# Patient Record
Sex: FEMALE | Race: WHITE | NOT HISPANIC OR LATINO | Employment: FULL TIME | URBAN - METROPOLITAN AREA
[De-identification: names, ages, dates, MRNs, and addresses within clinical notes are randomized per-mention and may not be internally consistent; named-entity substitution may affect disease eponyms.]

---

## 2022-11-09 ENCOUNTER — DOCUMENTATION ONLY (OUTPATIENT)
Dept: CARDIOLOGY | Facility: HOSPITAL | Age: 42
End: 2022-11-09
Payer: COMMERCIAL

## 2022-11-09 ENCOUNTER — CLINICAL SUPPORT (OUTPATIENT)
Dept: CARDIOLOGY | Facility: HOSPITAL | Age: 42
End: 2022-11-09
Attending: INTERNAL MEDICINE
Payer: COMMERCIAL

## 2022-11-09 ENCOUNTER — OFFICE VISIT (OUTPATIENT)
Dept: CARDIOLOGY | Facility: CLINIC | Age: 42
End: 2022-11-09
Payer: COMMERCIAL

## 2022-11-09 VITALS
BODY MASS INDEX: 36.38 KG/M2 | HEART RATE: 59 BPM | SYSTOLIC BLOOD PRESSURE: 110 MMHG | WEIGHT: 240.06 LBS | HEIGHT: 68 IN | DIASTOLIC BLOOD PRESSURE: 82 MMHG

## 2022-11-09 DIAGNOSIS — Z95.818 STATUS POST PLACEMENT OF IMPLANTABLE LOOP RECORDER: ICD-10-CM

## 2022-11-09 DIAGNOSIS — Z98.890 S/P ABLATION OF ATRIAL FLUTTER: ICD-10-CM

## 2022-11-09 DIAGNOSIS — Z86.79 S/P ABLATION OF ATRIAL FLUTTER: ICD-10-CM

## 2022-11-09 DIAGNOSIS — R94.39 EQUIVOCAL STRESS TEST: ICD-10-CM

## 2022-11-09 DIAGNOSIS — Z98.890 H/O CARDIAC RADIOFREQUENCY ABLATION: ICD-10-CM

## 2022-11-09 DIAGNOSIS — Z95.818 STATUS POST PLACEMENT OF IMPLANTABLE LOOP RECORDER: Primary | ICD-10-CM

## 2022-11-09 DIAGNOSIS — R94.39 ABNORMAL STRESS TEST: Primary | ICD-10-CM

## 2022-11-09 PROCEDURE — 99205 PR OFFICE/OUTPT VISIT, NEW, LEVL V, 60-74 MIN: ICD-10-PCS | Mod: S$GLB,,, | Performed by: INTERNAL MEDICINE

## 2022-11-09 PROCEDURE — 3079F PR MOST RECENT DIASTOLIC BLOOD PRESSURE 80-89 MM HG: ICD-10-PCS | Mod: CPTII,S$GLB,, | Performed by: INTERNAL MEDICINE

## 2022-11-09 PROCEDURE — 3008F BODY MASS INDEX DOCD: CPT | Mod: CPTII,S$GLB,, | Performed by: INTERNAL MEDICINE

## 2022-11-09 PROCEDURE — 93010 EKG 12-LEAD: ICD-10-PCS | Mod: S$GLB,,, | Performed by: INTERNAL MEDICINE

## 2022-11-09 PROCEDURE — 99999 PR PBB SHADOW E&M-NEW PATIENT-LVL III: ICD-10-PCS | Mod: PBBFAC,,, | Performed by: INTERNAL MEDICINE

## 2022-11-09 PROCEDURE — 99999 PR PBB SHADOW E&M-NEW PATIENT-LVL III: CPT | Mod: PBBFAC,,, | Performed by: INTERNAL MEDICINE

## 2022-11-09 PROCEDURE — 1159F PR MEDICATION LIST DOCUMENTED IN MEDICAL RECORD: ICD-10-PCS | Mod: CPTII,S$GLB,, | Performed by: INTERNAL MEDICINE

## 2022-11-09 PROCEDURE — 1159F MED LIST DOCD IN RCRD: CPT | Mod: CPTII,S$GLB,, | Performed by: INTERNAL MEDICINE

## 2022-11-09 PROCEDURE — 3008F PR BODY MASS INDEX (BMI) DOCUMENTED: ICD-10-PCS | Mod: CPTII,S$GLB,, | Performed by: INTERNAL MEDICINE

## 2022-11-09 PROCEDURE — 3074F SYST BP LT 130 MM HG: CPT | Mod: CPTII,S$GLB,, | Performed by: INTERNAL MEDICINE

## 2022-11-09 PROCEDURE — 93010 ELECTROCARDIOGRAM REPORT: CPT | Mod: S$GLB,,, | Performed by: INTERNAL MEDICINE

## 2022-11-09 PROCEDURE — 3074F PR MOST RECENT SYSTOLIC BLOOD PRESSURE < 130 MM HG: ICD-10-PCS | Mod: CPTII,S$GLB,, | Performed by: INTERNAL MEDICINE

## 2022-11-09 PROCEDURE — 99205 OFFICE O/P NEW HI 60 MIN: CPT | Mod: S$GLB,,, | Performed by: INTERNAL MEDICINE

## 2022-11-09 PROCEDURE — 1160F PR REVIEW ALL MEDS BY PRESCRIBER/CLIN PHARMACIST DOCUMENTED: ICD-10-PCS | Mod: CPTII,S$GLB,, | Performed by: INTERNAL MEDICINE

## 2022-11-09 PROCEDURE — 93005 ELECTROCARDIOGRAM TRACING: CPT | Mod: PO

## 2022-11-09 PROCEDURE — 1160F RVW MEDS BY RX/DR IN RCRD: CPT | Mod: CPTII,S$GLB,, | Performed by: INTERNAL MEDICINE

## 2022-11-09 PROCEDURE — 3079F DIAST BP 80-89 MM HG: CPT | Mod: CPTII,S$GLB,, | Performed by: INTERNAL MEDICINE

## 2022-11-09 RX ORDER — ASPIRIN 81 MG/1
81 TABLET ORAL DAILY
COMMUNITY

## 2022-11-09 RX ORDER — METOPROLOL TARTRATE 25 MG/1
25 TABLET, FILM COATED ORAL 2 TIMES DAILY
COMMUNITY
End: 2023-02-22 | Stop reason: SDUPTHER

## 2022-11-09 NOTE — PROGRESS NOTES
Subjective:    Patient ID:  Caroline Hartman is a 42 y.o. female who presents for evaluation of Abnormal Stress Test (New patient)      Problem List Items Addressed This Visit          Cardiac/Vascular    H/O cardiac radiofrequency ablation    Overview     SVT ablation around 2014 with Dr. Bliss         S/P ablation of atrial flutter    Overview     Dr. Bliss Around 2013          Other Visit Diagnoses       Abnormal stress test    -  Primary    Equivocal stress test                HPI    Here to establish care    The patient states that she has been seeing an outside Cardiologist for about 10 years. Wanting care in the [facility] system.    Still with intermittent chest pains, come son with stress  Had LHC in Feb 2021 without evidence of obstructive CAD  Symptoms persisting    No shortness of breath  Has loop recorder in place  Gets on treadmill and sometimes has exertional pain    Had echo in June 2022 with preserved EF and mid MR  Considering she was still having intermittent chest pain, had nuclear stress test with large anterior fixed defect, but preserved EF. Unclear cause per past Cards MD  Got diagnosed with atrial flutter in the past s/p ablations in the past, was on propafenone and taken off    Personal history of heart attack or stroke - None that she is aware of  Family history of heart disease - Dad with MI x 3, youngest was late 50s, has aFib, CHF    History reviewed. No pertinent past medical history.    History reviewed. No pertinent surgical history.    History reviewed. No pertinent family history.    Social History     Socioeconomic History    Marital status:        Review of patient's allergies indicates:   Allergen Reactions    Sulfa (sulfonamide antibiotics) Hives and Anxiety       Review of Systems   Constitutional: Negative for decreased appetite, fever and malaise/fatigue.   Eyes:  Negative for blurred vision.   Cardiovascular:  Negative for chest pain, dyspnea on exertion, irregular  "heartbeat and leg swelling.   Respiratory:  Negative for cough, hemoptysis, shortness of breath and wheezing.    Endocrine: Negative for cold intolerance and heat intolerance.   Hematologic/Lymphatic: Negative for bleeding problem.   Musculoskeletal:  Negative for muscle weakness and myalgias.   Gastrointestinal:  Negative for abdominal pain, constipation and diarrhea.   Genitourinary:  Negative for bladder incontinence.   Neurological:  Negative for dizziness and weakness.   Psychiatric/Behavioral:  Negative for depression.       Objective:     Vitals:    11/09/22 0904   BP: 110/82   BP Location: Right arm   Patient Position: Sitting   BP Method: Large (Automatic)   Pulse: (!) 59   Weight: 108.9 kg (240 lb 1.3 oz)   Height: 5' 8" (1.727 m)        Physical Exam  Vitals and nursing note reviewed.   Constitutional:       General: She is not in acute distress.     Appearance: She is well-developed.   HENT:      Head: Normocephalic and atraumatic.   Neck:      Vascular: No JVD.   Cardiovascular:      Rate and Rhythm: Normal rate and regular rhythm.      Heart sounds: Normal heart sounds. No murmur heard.    No friction rub. No gallop.   Pulmonary:      Effort: Pulmonary effort is normal. No respiratory distress.      Breath sounds: Normal breath sounds. No wheezing or rales.   Abdominal:      General: Bowel sounds are normal.      Palpations: Abdomen is soft.      Tenderness: There is no abdominal tenderness. There is no guarding or rebound.   Musculoskeletal:         General: No tenderness.      Cervical back: Neck supple.   Skin:     General: Skin is warm and dry.   Neurological:      Mental Status: She is alert and oriented to person, place, and time.   Psychiatric:         Behavior: Behavior normal.           Current Outpatient Medications   Medication Instructions    aspirin (ECOTRIN) 81 mg, Oral, Daily    metoprolol tartrate (LOPRESSOR) 25 mg, Oral, 2 times daily       Lipid Panel:   No results found for: CHOL, " HDL, LDLCALC, TRIG, CHOLHDL      The ASCVD Risk score (Maria Elena DK, et al., 2019) failed to calculate for the following reasons:    Cannot find a previous HDL lab    Cannot find a previous total cholesterol lab    The smoking status is invalid    Unable to determine if patient is Non-     All pertinent labs, imaging, and EKGs reviewed.  Patient's most recent EKG tracing was personally interpreted by this provider.    Most Recent Echocardiogram Results  No results found for this or any previous visit.        Most Recent EKG  No results found for this or any previous visit.    No valid procedures specified.   No results found for this or any previous visit.    No valid procedures specified.    Assessment:       1. Abnormal stress test    2. S/P ablation of atrial flutter    3. H/O cardiac radiofrequency ablation    4. Equivocal stress test         Plan:     Symptoms of atypical chest pain in the setting of equivocal nuclear stress test  BP/Pulse OK today  Most recent outside hospital echocardiogram reviewed personally     Cardiac PET stress at main [place] considering equivocal outside nuclear stress test   Lipid panel was done at outside hospital  Enroll in device clinic to follow loop recorder     Continue other cardiac medications  Mediterranean Diet/Cardiovascular Exercise Program    Patient queried and all questions were answered.    Greater than 60 minutes of total time was spent for this patient on the date of the encounter including chart review, outside records review, interview, and medical decision making.    F/u in 6 months to reassess      Signed:    Carlos Goode MD  11/9/2022 7:34 AM

## 2022-12-11 ENCOUNTER — CLINICAL SUPPORT (OUTPATIENT)
Dept: CARDIOLOGY | Facility: HOSPITAL | Age: 42
End: 2022-12-11
Payer: COMMERCIAL

## 2022-12-11 DIAGNOSIS — Z95.818 PRESENCE OF OTHER CARDIAC IMPLANTS AND GRAFTS: ICD-10-CM

## 2022-12-11 PROCEDURE — 93298 CARDIAC DEVICE CHECK - REMOTE: ICD-10-PCS | Mod: ,,, | Performed by: INTERNAL MEDICINE

## 2022-12-11 PROCEDURE — 93298 REM INTERROG DEV EVAL SCRMS: CPT | Mod: ,,, | Performed by: INTERNAL MEDICINE

## 2022-12-11 PROCEDURE — G2066 INTER DEVC REMOTE 30D: HCPCS | Mod: PO | Performed by: INTERNAL MEDICINE

## 2022-12-15 ENCOUNTER — TELEPHONE (OUTPATIENT)
Dept: CARDIOLOGY | Facility: HOSPITAL | Age: 42
End: 2022-12-15
Payer: COMMERCIAL

## 2023-01-10 ENCOUNTER — CLINICAL SUPPORT (OUTPATIENT)
Dept: CARDIOLOGY | Facility: HOSPITAL | Age: 43
End: 2023-01-10
Payer: COMMERCIAL

## 2023-01-10 DIAGNOSIS — Z95.818 PRESENCE OF OTHER CARDIAC IMPLANTS AND GRAFTS: ICD-10-CM

## 2023-01-10 PROCEDURE — G2066 INTER DEVC REMOTE 30D: HCPCS | Mod: PO | Performed by: INTERNAL MEDICINE

## 2023-02-08 ENCOUNTER — HOSPITAL ENCOUNTER (OUTPATIENT)
Dept: CARDIOLOGY | Facility: HOSPITAL | Age: 43
Discharge: HOME | End: 2023-02-08
Attending: INTERNAL MEDICINE
Payer: COMMERCIAL

## 2023-02-08 VITALS
WEIGHT: 240 LBS | HEART RATE: 82 BPM | BODY MASS INDEX: 36.37 KG/M2 | RESPIRATION RATE: 16 BRPM | SYSTOLIC BLOOD PRESSURE: 112 MMHG | HEIGHT: 68 IN | DIASTOLIC BLOOD PRESSURE: 52 MMHG

## 2023-02-08 DIAGNOSIS — R94.39 EQUIVOCAL STRESS TEST: ICD-10-CM

## 2023-02-08 LAB
CFR FLOW - ANTERIOR: 2.09
CFR FLOW - INFERIOR: 2.04
CFR FLOW - LATERAL: 2.15
CFR FLOW - MAX: 2.73
CFR FLOW - MIN: 1.59
CFR FLOW - SEPTAL: 2.27
CFR FLOW - WHOLE HEART: 2.14
CFR FLOW- DEFECT 1: 2.04
CV STRESS BASE HR: 65 BPM
DIASTOLIC BLOOD PRESSURE: 66 MMHG
EJECTION FRACTION- HIGH: 59 %
END DIASTOLIC INDEX-HIGH: 155 ML/M2
END DIASTOLIC INDEX-LOW: 91 ML/M2
END SYSTOLIC INDEX-HIGH: 78 ML/M2
END SYSTOLIC INDEX-LOW: 40 ML/M2
NUC REST DIASTOLIC VOLUME INDEX: 95
NUC REST EJECTION FRACTION: 67
NUC REST SYSTOLIC VOLUME INDEX: 32
NUC STRESS DIASTOLIC VOLUME INDEX: 101
NUC STRESS EJECTION FRACTION: 75 %
NUC STRESS SYSTOLIC VOLUME INDEX: 25
OHS CV CPX 1 MINUTE RECOVERY HEART RATE: 106 BPM
OHS CV CPX 85 PERCENT MAX PREDICTED HEART RATE MALE: 144
OHS CV CPX MAX PREDICTED HEART RATE: 170
OHS CV CPX PATIENT IS FEMALE: 1
OHS CV CPX PATIENT IS MALE: 0
OHS CV CPX PEAK DIASTOLIC BLOOD PRESSURE: 45 MMHG
OHS CV CPX PEAK HEAR RATE: 75 BPM
OHS CV CPX PEAK RATE PRESSURE PRODUCT: 8400
OHS CV CPX PEAK SYSTOLIC BLOOD PRESSURE: 112 MMHG
OHS CV CPX PERCENT MAX PREDICTED HEART RATE ACHIEVED: 44
OHS CV CPX RATE PRESSURE PRODUCT PRESENTING: 7735
PERFUSION DEFECT 1 SIZE IN %: 25 %
PERFUSION DEFECT 2 SIZE IN %: 3 %
REST FLOW - ANTERIOR: 0.52 CC/MIN/G
REST FLOW - INFERIOR: 0.55 CC/MIN/G
REST FLOW - LATERAL: 0.68 CC/MIN/G
REST FLOW - MAX: 1.03 CC/MIN/G
REST FLOW - MIN: 0.28 CC/MIN/G
REST FLOW - SEPTAL: 0.47 CC/MIN/G
REST FLOW - WHOLE HEART: 0.56 CC/MIN/G
REST FLOW- DEFECT 1: 0.38 CC/MIN/G
RETIRED EF AND QEF - SEE NOTES: 47 %
STRESS FLOW - ANTERIOR: 1.08 CC/MIN/G
STRESS FLOW - INFERIOR: 1.13 CC/MIN/G
STRESS FLOW - LATERAL: 1.44 CC/MIN/G
STRESS FLOW - MAX: 1.98 CC/MIN/G
STRESS FLOW - MIN: 0.57 CC/MIN/G
STRESS FLOW - SEPTAL: 1.07 CC/MIN/G
STRESS FLOW - WHOLE HEART: 1.18 CC/MIN/G
STRESS FLOW- DEFECT 1: 0.78 CC/MIN/G
SYSTOLIC BLOOD PRESSURE: 119 MMHG

## 2023-02-08 PROCEDURE — 78434 AQMBF PET REST & RX STRESS: CPT

## 2023-02-08 PROCEDURE — 78431 CARDIAC PET SCAN STRESS (CUPID ONLY): ICD-10-PCS | Mod: 26,,, | Performed by: INTERNAL MEDICINE

## 2023-02-08 PROCEDURE — 93018 CARDIAC PET SCAN STRESS (CUPID ONLY): ICD-10-PCS | Mod: ,,, | Performed by: INTERNAL MEDICINE

## 2023-02-08 PROCEDURE — 78434 AQMBF PET REST & RX STRESS: CPT | Mod: 26,,, | Performed by: INTERNAL MEDICINE

## 2023-02-08 PROCEDURE — 93016 CV STRESS TEST SUPVJ ONLY: CPT | Mod: ,,, | Performed by: INTERNAL MEDICINE

## 2023-02-08 PROCEDURE — 78431 MYOCRD IMG PET RST&STRS CT: CPT

## 2023-02-08 PROCEDURE — 78434 CARDIAC PET SCAN STRESS (CUPID ONLY): ICD-10-PCS | Mod: 26,,, | Performed by: INTERNAL MEDICINE

## 2023-02-08 PROCEDURE — 93016 CARDIAC PET SCAN STRESS (CUPID ONLY): ICD-10-PCS | Mod: ,,, | Performed by: INTERNAL MEDICINE

## 2023-02-08 PROCEDURE — 78431 MYOCRD IMG PET RST&STRS CT: CPT | Mod: 26,,, | Performed by: INTERNAL MEDICINE

## 2023-02-08 PROCEDURE — 63600175 PHARM REV CODE 636 W HCPCS: Performed by: INTERNAL MEDICINE

## 2023-02-08 PROCEDURE — 93018 CV STRESS TEST I&R ONLY: CPT | Mod: ,,, | Performed by: INTERNAL MEDICINE

## 2023-02-08 RX ORDER — REGADENOSON 0.08 MG/ML
0.4 INJECTION, SOLUTION INTRAVENOUS ONCE
Status: COMPLETED | OUTPATIENT
Start: 2023-02-08 | End: 2023-02-08

## 2023-02-08 RX ORDER — CAFFEINE CITRATE 20 MG/ML
60 SOLUTION INTRAVENOUS ONCE
Status: COMPLETED | OUTPATIENT
Start: 2023-02-08 | End: 2023-02-08

## 2023-02-08 RX ADMIN — CAFFEINE CITRATE 60 MG: 20 INJECTION INTRAVENOUS at 08:02

## 2023-02-08 RX ADMIN — REGADENOSON 0.4 MG: 0.08 INJECTION, SOLUTION INTRAVENOUS at 08:02

## 2023-02-09 ENCOUNTER — CLINICAL SUPPORT (OUTPATIENT)
Dept: CARDIOLOGY | Facility: HOSPITAL | Age: 43
End: 2023-02-09
Payer: COMMERCIAL

## 2023-02-09 DIAGNOSIS — Z95.818 PRESENCE OF OTHER CARDIAC IMPLANTS AND GRAFTS: ICD-10-CM

## 2023-02-09 PROCEDURE — 93298 REM INTERROG DEV EVAL SCRMS: CPT | Mod: ,,, | Performed by: INTERNAL MEDICINE

## 2023-02-09 PROCEDURE — G2066 INTER DEVC REMOTE 30D: HCPCS | Mod: PO | Performed by: INTERNAL MEDICINE

## 2023-02-09 PROCEDURE — 93298 CARDIAC DEVICE CHECK - REMOTE: ICD-10-PCS | Mod: ,,, | Performed by: INTERNAL MEDICINE

## 2023-02-22 DIAGNOSIS — Z98.890 H/O CARDIAC RADIOFREQUENCY ABLATION: ICD-10-CM

## 2023-02-22 DIAGNOSIS — Z98.890 S/P ABLATION OF ATRIAL FLUTTER: ICD-10-CM

## 2023-02-22 DIAGNOSIS — Z86.79 S/P ABLATION OF ATRIAL FLUTTER: ICD-10-CM

## 2023-02-22 DIAGNOSIS — Z00.00 ANNUAL PHYSICAL EXAM: Primary | ICD-10-CM

## 2023-02-22 RX ORDER — METOPROLOL TARTRATE 25 MG/1
25 TABLET, FILM COATED ORAL 2 TIMES DAILY
Qty: 180 TABLET | Refills: 3 | Status: SHIPPED | OUTPATIENT
Start: 2023-02-22 | End: 2023-03-29

## 2023-02-22 NOTE — TELEPHONE ENCOUNTER
----- Message from Xi Ahmadi sent at 2/22/2023  1:39 PM CST -----  Contact: patient  Type:  RX Refill Request    Who Called:  patient  Refill or New Rx:  refill  RX Name and Strength:  metoprolol tartrate (LOPRESSOR) 25 MG tablet  How is the patient currently taking it? (ex. 1XDay):  as directed  Is this a 30 day or 90 day RX:  90  Preferred Pharmacy with phone number:    [id number] [place] [address] & [address] [address]  Phone: [phone] Fax: [phone]  Local or Mail Order:  local  Ordering Provider:  mando Joe Call Back Number:  [phone]  Additional Information:

## 2023-03-11 ENCOUNTER — CLINICAL SUPPORT (OUTPATIENT)
Dept: CARDIOLOGY | Facility: HOSPITAL | Age: 43
End: 2023-03-11
Payer: COMMERCIAL

## 2023-03-11 DIAGNOSIS — Z95.818 PRESENCE OF OTHER CARDIAC IMPLANTS AND GRAFTS: ICD-10-CM

## 2023-03-11 PROCEDURE — G2066 INTER DEVC REMOTE 30D: HCPCS | Mod: PO | Performed by: INTERNAL MEDICINE

## 2023-03-28 NOTE — PROGRESS NOTES
"Subjective:    Patient ID:  Caroline Hartman is a 42 y.o. female who presents for follow-up of abdnormal stress test      Problem List Items Addressed This Visit          Cardiac/Vascular    H/O cardiac radiofrequency ablation    Overview     SVT ablation around 2014 with Dr. Bliss         S/P ablation of atrial flutter    Overview     Dr. Bliss Around 2013          Other Visit Diagnoses       Abnormal stress test    -  Primary            HPI    Patient was last seen on 11/09/2022 at which time she presented to [facility] care.  Had abnormal nuclear stress test that showed large scar.  Cardiac PET stress was ordered for evaluation.  Results as below.  Presents today to discuss PET stress results.    On assessment today, the patient states that she feels OK. Still with some intermittent fleeting pains.     Was on treadmill a few months ago and had chest pain with exertion.    Staying as active as she can       Objective:     Vitals:    03/29/23 0927   BP: 114/78   BP Location: Left arm   Patient Position: Sitting   BP Method: Large (Automatic)   Pulse: (!) 58   Weight: 111.6 kg (246 lb 0.5 oz)   Height: 5' 8" (1.727 m)        Physical Exam  Vitals and nursing note reviewed.   Constitutional:       General: She is not in acute distress.     Appearance: She is well-developed.   HENT:      Head: Normocephalic and atraumatic.   Neck:      Vascular: No JVD.   Cardiovascular:      Rate and Rhythm: Normal rate and regular rhythm.      Heart sounds: Normal heart sounds. No murmur heard.    No friction rub. No gallop.   Pulmonary:      Effort: Pulmonary effort is normal. No respiratory distress.      Breath sounds: Normal breath sounds. No wheezing or rales.   Abdominal:      General: Bowel sounds are normal.      Palpations: Abdomen is soft.      Tenderness: There is no abdominal tenderness. There is no guarding or rebound.   Musculoskeletal:         General: No tenderness.      Cervical back: Neck supple.   Skin:     " General: Skin is warm and dry.   Neurological:      Mental Status: She is alert and oriented to person, place, and time.   Psychiatric:         Behavior: Behavior normal.           Current Outpatient Medications   Medication Instructions    aspirin (ECOTRIN) 81 mg, Oral, Daily    metoprolol tartrate (LOPRESSOR) 25 mg, Oral, 2 times daily    naproxen (NAPROSYN) 500 mg, Oral, Every 12 hours PRN    ondansetron (ZOFRAN-ODT) 4 mg, Oral, Every 6 hours PRN    pantoprazole (PROTONIX) 20 mg, Oral, Daily    propafenone (RHTHYMOL) 150 mg, Oral, Every 8 hours       Lipid Panel:   No results found for: CHOL, HDL, LDLCALC, TRIG, CHOLHDL    The ASCVD Risk score (Maria Elena DK, et al., 2019) failed to calculate for the following reasons:    Cannot find a previous HDL lab    Cannot find a previous total cholesterol lab    The smoking status is invalid    All pertinent labs, imaging, and EKGs reviewed.  Patient's most recent EKG tracing was personally interpreted by this provider.    Most Recent EKG Results  Results for orders placed or performed in visit on 11/09/22   IN OFFICE EKG 12-LEAD (to [place])    Collection Time: 11/09/22  8:59 AM    Narrative    Test Reason : z00.00    Vent. Rate : 070 BPM     Atrial Rate : 070 BPM     P-R Int : 168 ms          QRS Dur : 086 ms      QT Int : 398 ms       P-R-T Axes : 026 052 020 degrees     QTc Int : 429 ms    Normal sinus rhythm  Normal ECG  No previous ECGs available  Confirmed by JEREMY DOSS MD ([id number]) on 11/9/2022 6:56:00 PM    Referred By: [email or web address]   SELF           Confirmed By:JEREMY DOSS MD       Most Recent Echocardiogram Results  No results found for this or any previous visit.      Most Recent Nuclear Stress Test Results  No results found for this or any previous visit.      Most Recent Cardiac PET Stress Test Results  Results for orders placed during the hospital encounter of 02/08/23    Cardiac PET Scan Stress    Interpretation Summary    There are two significant perfusion  abnormalities.    Perfusion Abnormality #1 - First, there is a large sized, mild to moderate intensity mid to apical anterior, anteroseptal, inferoapical and lateral apical resting perfusion abnormality involving 25% of LV myocardium in the typical distribution of the distal LAD territory. After pharmacologic stress, this defect improves, indicative of non-transmural scar.    Within the perfusion abnormality, absolute myocardial perfusion (cc/min/gm) averaged 0.38 cc/min/g at rest, 0.78 cc/min/g at stress and CFR was 2.04 , which equates to mildly reduced coronary flow capacity within the LAD territory.    Perfusion Abnormality #2 - Second, there is a very small (<5%) sized, mild intensity basal to mid anterior resting perfusion abnormality involving 3% of LV myocardium in the typical distribution of the ramus territory. After pharmacologic stress, this defect improves, indicative of non-transmural scar.    There are no other significant perfusion abnormalities.    The whole heart absolute myocardial perfusion values averaged 0.56 cc/min/g at rest, which is reduced; 1.18 cc/min/g at stress, which is mildly reduced; and CFR is 2.14 , which is mildly reduced.    CT attenuation images demonstrate no coronary calcifications and no aortic calcifications.    The gated perfusion images showed an ejection fraction of 67% at rest and 75% during stress. A normal ejection fraction is greater than 47%.    The wall motion is normal at rest and during stress.    The LV cavity size is normal at rest and stress.    The ECG portion of the study is negative for ischemia.    During stress, rare PVCs are noted.    The patient reported chest pain during the stress test.    There are no prior studies for comparison.      Most Recent Cardiovascular Angiogram results  No results found for this or any previous visit.      Other Most Recent Cardiology Results  Results for orders placed in visit on 03/11/23    Cardiac device check -  Remote    Narrative  Battery and Leads (BL)  Normal battery parameters    Presenting Rhythm (OK)  Normal Sinus Rhythm    Arrhythmic events (AE)  No new arrhythmic events in monitoring period    Transmission Information (TI)  Implant indication: Atrial Fibrillation Management  Device Summary Report    Follow Up (FU)  Continue remote monitoring with monthly reporting    Additional Comments  ILR Report  Monitoring period: 1/20/23-2/19/23    Battery Status: OK        Assessment:       1. Abnormal stress test    2. H/O cardiac radiofrequency ablation    3. S/P ablation of atrial flutter         Plan:     Symptoms of intermittent pains persist  BP/Pulse OK today  Most recent echocardiogram reviewed personally   Most recent cardiac PET stress reviewed personally   Had LH in 2021 without obstructive CAD, had a bad reaction to it, thought she had a stroke, and hesitant to do another one   Need to consider cardiac syndrome X as well    Will monitor at this time and hold off on angiogram unless symptoms worsen  Start amlodipine 2.5mg PO Daily - Educated on risks/benefits of medication  Continue aspirin 81 mg PO Daily  Reduce metoprolol tartrate to 12.5 mg PO BID  Continue propafenone 150 mg q.8 hours   Lipid panel  ED precautions given    Continue other cardiac medications  Mediterranean Diet/Cardiovascular Exercise Program    Patient queried and all questions were answered.    F/u in 3-4 months to reassess symptoms      Signed:    Carlos Goode MD  3/29/2023 5:09 PM

## 2023-03-29 ENCOUNTER — OFFICE VISIT (OUTPATIENT)
Dept: CARDIOLOGY | Facility: CLINIC | Age: 43
End: 2023-03-29
Payer: COMMERCIAL

## 2023-03-29 VITALS
HEIGHT: 68 IN | BODY MASS INDEX: 37.29 KG/M2 | WEIGHT: 246.06 LBS | HEART RATE: 58 BPM | DIASTOLIC BLOOD PRESSURE: 78 MMHG | SYSTOLIC BLOOD PRESSURE: 114 MMHG

## 2023-03-29 DIAGNOSIS — Z98.890 H/O CARDIAC RADIOFREQUENCY ABLATION: ICD-10-CM

## 2023-03-29 DIAGNOSIS — R94.39 ABNORMAL STRESS TEST: Primary | ICD-10-CM

## 2023-03-29 DIAGNOSIS — Z00.00 ANNUAL PHYSICAL EXAM: ICD-10-CM

## 2023-03-29 DIAGNOSIS — Z86.79 S/P ABLATION OF ATRIAL FLUTTER: ICD-10-CM

## 2023-03-29 DIAGNOSIS — Z98.890 S/P ABLATION OF ATRIAL FLUTTER: ICD-10-CM

## 2023-03-29 DIAGNOSIS — Z13.220 SCREENING CHOLESTEROL LEVEL: ICD-10-CM

## 2023-03-29 PROCEDURE — 93010 ELECTROCARDIOGRAM REPORT: CPT | Mod: S$GLB,,, | Performed by: INTERNAL MEDICINE

## 2023-03-29 PROCEDURE — 99999 PR PBB SHADOW E&M-EST. PATIENT-LVL III: ICD-10-PCS | Mod: PBBFAC,,, | Performed by: INTERNAL MEDICINE

## 2023-03-29 PROCEDURE — 93010 EKG 12-LEAD: ICD-10-PCS | Mod: S$GLB,,, | Performed by: INTERNAL MEDICINE

## 2023-03-29 PROCEDURE — 1159F PR MEDICATION LIST DOCUMENTED IN MEDICAL RECORD: ICD-10-PCS | Mod: CPTII,S$GLB,, | Performed by: INTERNAL MEDICINE

## 2023-03-29 PROCEDURE — 1160F PR REVIEW ALL MEDS BY PRESCRIBER/CLIN PHARMACIST DOCUMENTED: ICD-10-PCS | Mod: CPTII,S$GLB,, | Performed by: INTERNAL MEDICINE

## 2023-03-29 PROCEDURE — 99214 PR OFFICE/OUTPT VISIT, EST, LEVL IV, 30-39 MIN: ICD-10-PCS | Mod: 25,S$GLB,, | Performed by: INTERNAL MEDICINE

## 2023-03-29 PROCEDURE — 3008F PR BODY MASS INDEX (BMI) DOCUMENTED: ICD-10-PCS | Mod: CPTII,S$GLB,, | Performed by: INTERNAL MEDICINE

## 2023-03-29 PROCEDURE — 3078F PR MOST RECENT DIASTOLIC BLOOD PRESSURE < 80 MM HG: ICD-10-PCS | Mod: CPTII,S$GLB,, | Performed by: INTERNAL MEDICINE

## 2023-03-29 PROCEDURE — 3008F BODY MASS INDEX DOCD: CPT | Mod: CPTII,S$GLB,, | Performed by: INTERNAL MEDICINE

## 2023-03-29 PROCEDURE — 3074F PR MOST RECENT SYSTOLIC BLOOD PRESSURE < 130 MM HG: ICD-10-PCS | Mod: CPTII,S$GLB,, | Performed by: INTERNAL MEDICINE

## 2023-03-29 PROCEDURE — 3074F SYST BP LT 130 MM HG: CPT | Mod: CPTII,S$GLB,, | Performed by: INTERNAL MEDICINE

## 2023-03-29 PROCEDURE — 1160F RVW MEDS BY RX/DR IN RCRD: CPT | Mod: CPTII,S$GLB,, | Performed by: INTERNAL MEDICINE

## 2023-03-29 PROCEDURE — 3078F DIAST BP <80 MM HG: CPT | Mod: CPTII,S$GLB,, | Performed by: INTERNAL MEDICINE

## 2023-03-29 PROCEDURE — 99999 PR PBB SHADOW E&M-EST. PATIENT-LVL III: CPT | Mod: PBBFAC,,, | Performed by: INTERNAL MEDICINE

## 2023-03-29 PROCEDURE — 1159F MED LIST DOCD IN RCRD: CPT | Mod: CPTII,S$GLB,, | Performed by: INTERNAL MEDICINE

## 2023-03-29 PROCEDURE — 93005 ELECTROCARDIOGRAM TRACING: CPT | Mod: PO

## 2023-03-29 PROCEDURE — 99214 OFFICE O/P EST MOD 30 MIN: CPT | Mod: 25,S$GLB,, | Performed by: INTERNAL MEDICINE

## 2023-03-29 RX ORDER — METOPROLOL TARTRATE 25 MG/1
12.5 TABLET, FILM COATED ORAL 2 TIMES DAILY
Qty: 90 TABLET | Refills: 3 | Status: SHIPPED | OUTPATIENT
Start: 2023-03-29 | End: 2023-04-28

## 2023-03-29 RX ORDER — AMLODIPINE BESYLATE 2.5 MG/1
2.5 TABLET ORAL DAILY
Qty: 90 TABLET | Refills: 3 | Status: SHIPPED | OUTPATIENT
Start: 2023-03-29 | End: 2023-04-04 | Stop reason: ALTCHOICE

## 2023-03-30 ENCOUNTER — PATIENT MESSAGE (OUTPATIENT)
Dept: CARDIOLOGY | Facility: CLINIC | Age: 43
End: 2023-03-30
Payer: COMMERCIAL

## 2023-04-03 ENCOUNTER — PATIENT MESSAGE (OUTPATIENT)
Dept: CARDIOLOGY | Facility: CLINIC | Age: 43
End: 2023-04-03
Payer: COMMERCIAL

## 2023-04-04 RX ORDER — NIFEDIPINE 30 MG/1
30 TABLET, FILM COATED, EXTENDED RELEASE ORAL DAILY
Qty: 90 TABLET | Refills: 3 | Status: SHIPPED | OUTPATIENT
Start: 2023-04-04 | End: 2023-04-28

## 2023-04-05 ENCOUNTER — PATIENT MESSAGE (OUTPATIENT)
Dept: CARDIOLOGY | Facility: CLINIC | Age: 43
End: 2023-04-05
Payer: COMMERCIAL

## 2023-04-06 ENCOUNTER — PATIENT MESSAGE (OUTPATIENT)
Dept: CARDIOLOGY | Facility: CLINIC | Age: 43
End: 2023-04-06
Payer: COMMERCIAL

## 2023-04-10 ENCOUNTER — CLINICAL SUPPORT (OUTPATIENT)
Dept: CARDIOLOGY | Facility: HOSPITAL | Age: 43
End: 2023-04-10
Payer: COMMERCIAL

## 2023-04-10 DIAGNOSIS — Z95.818 PRESENCE OF OTHER CARDIAC IMPLANTS AND GRAFTS: ICD-10-CM

## 2023-04-10 PROCEDURE — G2066 INTER DEVC REMOTE 30D: HCPCS | Mod: PO | Performed by: INTERNAL MEDICINE

## 2023-04-10 PROCEDURE — 93298 CARDIAC DEVICE CHECK - REMOTE: ICD-10-PCS | Mod: ,,, | Performed by: INTERNAL MEDICINE

## 2023-04-10 PROCEDURE — 93298 REM INTERROG DEV EVAL SCRMS: CPT | Mod: ,,, | Performed by: INTERNAL MEDICINE

## 2023-04-27 ENCOUNTER — PATIENT MESSAGE (OUTPATIENT)
Dept: CARDIOLOGY | Facility: CLINIC | Age: 43
End: 2023-04-27
Payer: COMMERCIAL

## 2023-04-27 NOTE — PROGRESS NOTES
Subjective:    Patient ID:  Caroline Hartman is a 42 y.o. female who presents for follow-up of No chief complaint on file.      TELEMEDICINE VISIT      Problem List Items Addressed This Visit          Cardiac/Vascular    H/O cardiac radiofrequency ablation - Primary    Overview     SVT ablation around 2014 with Dr. Bliss           S/P ablation of atrial flutter    Overview     Dr. Bliss Around 2013              HPI    Patient was last seen on 03/29/2023 at which time she was dealing with some intermittent fleeting pain.  Amlodipine 2.5 mg PO Daily was started and metoprolol was reduced.  Patient did poorly on amlodipine, she was switched nifedipine and continued to do poorly.  Presents today for further discussion.    Patient presents for telemedicine visit.    On assessment today, the patient states that she is feeling OK.    Had a lightheadedness episode yesterday. Close to passing out. Had some palpitations. [place] and was back in the office doing . Has been a very long time since she has had an episode like that.    No chest pains.     Current metoprolol dose - 25mg PO BID  Doing OK off of propafenone, generally         Objective:   There were no vitals filed for this visit.    BP Readings from Last 5 Encounters:   03/29/23 114/78   02/08/23 (!) 112/52   11/09/22 110/82   12/24/17 (!) 163/91        Physical Exam  Constitutional:       General: She is not in acute distress.     Appearance: She is well-developed. She is not diaphoretic.   HENT:      Head: Normocephalic and atraumatic.   Eyes:      General: No scleral icterus.     Conjunctiva/sclera: Conjunctivae normal.   Pulmonary:      Effort: No respiratory distress.      Breath sounds: No stridor.   Musculoskeletal:         General: No signs of injury.      Cervical back: Normal range of motion.   Skin:     Coloration: Skin is not jaundiced.   Neurological:      Mental Status: She is alert. Mental status is at  baseline.   Psychiatric:         Mood and Affect: Mood normal.         Behavior: Behavior normal.           Current Outpatient Medications   Medication Instructions    aspirin (ECOTRIN) 81 mg, Oral, Daily    metoprolol tartrate (LOPRESSOR) 12.5 mg, Oral, 2 times daily    naproxen (NAPROSYN) 500 mg, Oral, Every 12 hours PRN    NIFEdipine (ADALAT CC) 30 mg, Oral, Daily    ondansetron (ZOFRAN-ODT) 4 mg, Oral, Every 6 hours PRN    pantoprazole (PROTONIX) 20 mg, Oral, Daily       Lipid Panel:   No results found for: CHOL, HDL, LDLCALC, TRIG, CHOLHDL    The ASCVD Risk score ([place] DK, et al., 2019) failed to calculate for the following reasons:    Cannot find a previous HDL lab    Cannot find a previous total cholesterol lab    The smoking status is invalid    All pertinent labs, imaging, and EKGs reviewed.  Patient's most recent EKG tracing was personally interpreted by this provider.    Most Recent EKG Results  Results for orders placed or performed in visit on 03/29/23   IN OFFICE EKG 12-LEAD (to [facility])    Collection Time: 03/29/23  9:33 AM    Narrative    Test Reason : Z98.890,Z98.890,Z86.79,R94.39,    Vent. Rate : 067 BPM     Atrial Rate : 067 BPM     P-R Int : 170 ms          QRS Dur : 078 ms      QT Int : 404 ms       P-R-T Axes : 050 083 047 degrees     QTc Int : 426 ms    Normal sinus rhythm  Normal ECG  When compared with ECG of 08-FEB-2023 08:32,  Previous ECG has undetermined rhythm, needs review  Confirmed by Russel SHANKAR, Carlos CUEVAS ([id number]) on 3/29/2023 12:17:34 PM    Referred By:             Confirmed By:Carlos Goode MD       Most Recent Echocardiogram Results  No results found for this or any previous visit.      Most Recent Nuclear Stress Test Results  No results found for this or any previous visit.      Most Recent Cardiac PET Stress Test Results  Results for orders placed during the hospital encounter of 02/08/23    Cardiac PET Scan Stress    Interpretation Summary    There are two significant  perfusion abnormalities.    Perfusion Abnormality #1 - First, there is a large sized, mild to moderate intensity mid to apical anterior, anteroseptal, inferoapical and lateral apical resting perfusion abnormality involving 25% of LV myocardium in the typical distribution of the distal LAD territory. After pharmacologic stress, this defect improves, indicative of non-transmural scar.    Within the perfusion abnormality, absolute myocardial perfusion (cc/min/gm) averaged 0.38 cc/min/g at rest, 0.78 cc/min/g at stress and CFR was 2.04 , which equates to mildly reduced coronary flow capacity within the LAD territory.    Perfusion Abnormality #2 - Second, there is a very small (<5%) sized, mild intensity basal to mid anterior resting perfusion abnormality involving 3% of LV myocardium in the typical distribution of the ramus territory. After pharmacologic stress, this defect improves, indicative of non-transmural scar.    There are no other significant perfusion abnormalities.    The whole heart absolute myocardial perfusion values averaged 0.56 cc/min/g at rest, which is reduced; 1.18 cc/min/g at stress, which is mildly reduced; and CFR is 2.14 , which is mildly reduced.    CT attenuation images demonstrate no coronary calcifications and no aortic calcifications.    The gated perfusion images showed an ejection fraction of 67% at rest and 75% during stress. A normal ejection fraction is greater than 47%.    The wall motion is normal at rest and during stress.    The LV cavity size is normal at rest and stress.    The ECG portion of the study is negative for ischemia.    During stress, rare PVCs are noted.    The patient reported chest pain during the stress test.    There are no prior studies for comparison.      Most Recent Cardiovascular Angiogram results  No results found for this or any previous visit.      Other Most Recent Cardiology Results  Results for orders placed in visit on 05/10/23    Cardiac device check -  Remote    Narrative  Battery and Leads (BL)  Normal battery parameters    Presenting Rhythm (FL)  Sinus Bradycardia    Arrhythmic events (AE)  False event(s) recorded due to PAC(s)/SVT/PVC(s)/VT    Transmission Information (TI)  Device Summary Report  Implant indication: Atrial Fibrillation Management    Follow Up (FU)  Continue remote monitoring with monthly reporting    Additional Comments  ILR Report.  Monitoring period:  03/21/23-04/20/23    Battery Status: OK      Assessment:       1. H/O cardiac radiofrequency ablation    2. S/P ablation of atrial flutter         Plan:     Symptoms now more concerning for recurrent arrhythmia, loop recorder evaluation from yesterday's event pending  BP/Pulse unable to be assessed on telemedicine visit  Most recent echocardiogram reviewed personally    Most recent cardiac PET stress reviewed personally   Had LHC in 2021 without obstructive CAD, had a bad reaction to it, thought she had a stroke, and hesitant to do another one   Need to consider cardiac syndrome X as well    Can consider Coronary CTA in the future if further ischemic eval is needed   Can consider Imdur 30 mg PO Daily in the future if chest pain recurs   Continue aspirin 81 mg PO Daily  Continue metoprolol tartrate 12.5 mg PO BID  If symptoms recur or ILR shows arrhythmia with yesterday episode, will need to restart the propafenone   May consider EP study if symptoms worsen   Stay well hydrated    Continue other cardiac medications  Mediterranean Diet/Cardiovascular Exercise Program    Patient queried and all questions were answered.    F/u in 3 months to reassess, virtual OK    The patient location is: Home   The chief complaint leading to consultation is:  Please see problem list above  Visit type: Virtual visit with synchronous audio and video  Total time spent with patient: 15 minutes   Each patient to whom he or she provides medical services by telemedicine is:  (1) informed of the relationship between the  physician and patient and the respective role of any other health care provider with respect to management of the patient; and (2) notified that he or she may decline to receive medical services by telemedicine and may withdraw from such care at any time.    Notes: See above       Signed:    Carlos oGode MD  4/27/2023 7:29 AM

## 2023-04-28 ENCOUNTER — TELEPHONE (OUTPATIENT)
Dept: CARDIOLOGY | Facility: HOSPITAL | Age: 43
End: 2023-04-28
Payer: COMMERCIAL

## 2023-04-28 ENCOUNTER — PATIENT MESSAGE (OUTPATIENT)
Dept: CARDIOLOGY | Facility: HOSPITAL | Age: 43
End: 2023-04-28
Payer: COMMERCIAL

## 2023-04-28 ENCOUNTER — OFFICE VISIT (OUTPATIENT)
Dept: CARDIOLOGY | Facility: CLINIC | Age: 43
End: 2023-04-28
Payer: COMMERCIAL

## 2023-04-28 DIAGNOSIS — Z98.890 S/P ABLATION OF ATRIAL FLUTTER: ICD-10-CM

## 2023-04-28 DIAGNOSIS — Z00.00 ANNUAL PHYSICAL EXAM: ICD-10-CM

## 2023-04-28 DIAGNOSIS — Z98.890 H/O CARDIAC RADIOFREQUENCY ABLATION: Primary | ICD-10-CM

## 2023-04-28 DIAGNOSIS — Z86.79 S/P ABLATION OF ATRIAL FLUTTER: ICD-10-CM

## 2023-04-28 PROCEDURE — 1159F MED LIST DOCD IN RCRD: CPT | Mod: CPTII,95,, | Performed by: INTERNAL MEDICINE

## 2023-04-28 PROCEDURE — 99214 OFFICE O/P EST MOD 30 MIN: CPT | Mod: 95,,, | Performed by: INTERNAL MEDICINE

## 2023-04-28 PROCEDURE — 99214 PR OFFICE/OUTPT VISIT, EST, LEVL IV, 30-39 MIN: ICD-10-PCS | Mod: 95,,, | Performed by: INTERNAL MEDICINE

## 2023-04-28 PROCEDURE — 1159F PR MEDICATION LIST DOCUMENTED IN MEDICAL RECORD: ICD-10-PCS | Mod: CPTII,95,, | Performed by: INTERNAL MEDICINE

## 2023-04-28 PROCEDURE — 1160F RVW MEDS BY RX/DR IN RCRD: CPT | Mod: CPTII,95,, | Performed by: INTERNAL MEDICINE

## 2023-04-28 PROCEDURE — 1160F PR REVIEW ALL MEDS BY PRESCRIBER/CLIN PHARMACIST DOCUMENTED: ICD-10-PCS | Mod: CPTII,95,, | Performed by: INTERNAL MEDICINE

## 2023-04-28 RX ORDER — METOPROLOL TARTRATE 25 MG/1
25 TABLET, FILM COATED ORAL 2 TIMES DAILY
Qty: 180 TABLET | Refills: 3
Start: 2023-04-28 | End: 2024-03-20

## 2023-05-01 ENCOUNTER — TELEPHONE (OUTPATIENT)
Dept: CARDIOLOGY | Facility: CLINIC | Age: 43
End: 2023-05-01
Payer: COMMERCIAL

## 2023-05-01 NOTE — TELEPHONE ENCOUNTER
Reviewed symptom event from 4/27/2023      EGM c/w SR w/ PVC

## 2023-05-01 NOTE — TELEPHONE ENCOUNTER
----- Message from Carlos Goode MD sent at 4/28/2023  2:37 PM CDT -----  OK, thanks  ----- Message -----  From: Jeanne Escalona  Sent: 4/28/2023   1:02 PM CDT  To: Carlos Goode MD    Her home monitor hasn't transmitted since 4/24. Ines called her to send a transmission, but the monitor was unplugged. She plugged it in to charge for an hour and tried to send, but didn't so Ines called her but she left home. She is going to let us know when she gets back home to troubleshoot. I'll send to you when we get it   Jeanne  ----- Message -----  From: Carlos Goode MD  Sent: 4/28/2023  10:03 AM CDT  To: Jeanne Escalona, Catina Galvan RN    She had an episode yesterday and pushed her symptom button, can we pull the strips please    -Carlos

## 2023-05-01 NOTE — TELEPHONE ENCOUNTER
Spoke to pt and advised per Dr Goode (Please let the patient know that the symptom she told me about correlated to regular rhythm with an isolated PVC, so no major significant arrhythmia which is good news)

## 2023-05-10 ENCOUNTER — CLINICAL SUPPORT (OUTPATIENT)
Dept: CARDIOLOGY | Facility: HOSPITAL | Age: 43
End: 2023-05-10
Payer: COMMERCIAL

## 2023-05-10 DIAGNOSIS — Z95.818 PRESENCE OF OTHER CARDIAC IMPLANTS AND GRAFTS: ICD-10-CM

## 2023-05-10 PROCEDURE — G2066 INTER DEVC REMOTE 30D: HCPCS | Mod: PO | Performed by: INTERNAL MEDICINE

## 2023-06-09 ENCOUNTER — CLINICAL SUPPORT (OUTPATIENT)
Dept: CARDIOLOGY | Facility: HOSPITAL | Age: 43
End: 2023-06-09
Payer: COMMERCIAL

## 2023-06-09 DIAGNOSIS — Z95.818 PRESENCE OF OTHER CARDIAC IMPLANTS AND GRAFTS: ICD-10-CM

## 2023-06-09 PROCEDURE — 93298 CARDIAC DEVICE CHECK - REMOTE: ICD-10-PCS | Mod: ,,, | Performed by: INTERNAL MEDICINE

## 2023-06-09 PROCEDURE — 93298 REM INTERROG DEV EVAL SCRMS: CPT | Mod: ,,, | Performed by: INTERNAL MEDICINE

## 2023-06-09 PROCEDURE — G2066 INTER DEVC REMOTE 30D: HCPCS | Mod: PO | Performed by: INTERNAL MEDICINE

## 2023-07-09 ENCOUNTER — CLINICAL SUPPORT (OUTPATIENT)
Dept: CARDIOLOGY | Facility: HOSPITAL | Age: 43
End: 2023-07-09
Payer: COMMERCIAL

## 2023-07-09 DIAGNOSIS — Z95.818 PRESENCE OF OTHER CARDIAC IMPLANTS AND GRAFTS: ICD-10-CM

## 2023-07-09 PROCEDURE — G2066 INTER DEVC REMOTE 30D: HCPCS | Mod: PO | Performed by: INTERNAL MEDICINE

## 2023-08-03 NOTE — PROGRESS NOTES
Subjective:    Patient ID:  Caroline Hartman is a 43 y.o. female who presents for follow-up of No chief complaint on file.      TELEMEDICINE VISIT      Problem List Items Addressed This Visit          Cardiac/Vascular    H/O cardiac radiofrequency ablation - Primary    Overview     SVT ablation around 2014 with Dr. Bliss         S/P ablation of atrial flutter    Overview     Dr. Bliss Around 2013            HPI    Patient was last seen on 04/28/2023 at which time plan was to investigate loop recorder for any evidence of arrhythmia.  Loop recorder showed sinus rhythm/sinus bradycardia with episode.    Patient presents for telemedicine visit.    On assessment today, the patient states that she feels well.    No significant recent issues.    No chest pain.  Rare palpitations.  How active are you - Trying to take it easy    Home BP - Good per her report       Objective:   There were no vitals filed for this visit.    BP Readings from Last 5 Encounters:   03/29/23 114/78   02/08/23 (!) 112/52   11/09/22 110/82   12/24/17 (!) 163/91        Physical Exam  Constitutional:       General: She is not in acute distress.     Appearance: She is well-developed. She is not diaphoretic.   HENT:      Head: Normocephalic and atraumatic.   Eyes:      General: No scleral icterus.     Conjunctiva/sclera: Conjunctivae normal.   Pulmonary:      Effort: No respiratory distress.      Breath sounds: No stridor.   Musculoskeletal:         General: No signs of injury.      Cervical back: Normal range of motion.   Skin:     Coloration: Skin is not jaundiced.   Neurological:      Mental Status: She is alert. Mental status is at baseline.   Psychiatric:         Mood and Affect: Mood normal.         Behavior: Behavior normal.             Current Outpatient Medications   Medication Instructions    aspirin (ECOTRIN) 81 mg, Oral, Daily    metoprolol tartrate (LOPRESSOR) 25 mg, Oral, 2 times daily    naproxen (NAPROSYN) 500 mg, Oral, Every 12 hours  "PRN    ondansetron (ZOFRAN-ODT) 4 mg, Oral, Every 6 hours PRN    pantoprazole (PROTONIX) 20 mg, Oral, Daily       Lipid Panel:   No results found for: "CHOL", "HDL", "LDLCALC", "TRIG", "CHOLHDL"    The ASCVD Risk score (Maria Elena LARA, et al., 2019) failed to calculate for the following reasons:    Cannot find a previous HDL lab    Cannot find a previous total cholesterol lab    The smoking status is invalid    All pertinent labs, imaging, and EKGs reviewed.  Patient's most recent EKG tracing was personally interpreted by this provider.    Most Recent EKG Results  Results for orders placed or performed in visit on 03/29/23   IN OFFICE EKG 12-LEAD (to [place])    Collection Time: 03/29/23  9:33 AM    Narrative    Test Reason : Z98.890,Z98.890,Z86.79,R94.39,    Vent. Rate : 067 BPM     Atrial Rate : 067 BPM     P-R Int : 170 ms          QRS Dur : 078 ms      QT Int : 404 ms       P-R-T Axes : 050 083 047 degrees     QTc Int : 426 ms    Normal sinus rhythm  Normal ECG  When compared with ECG of 08-FEB-2023 08:32,  Previous ECG has undetermined rhythm, needs review  Confirmed by Russel SHANKAR, Carlos CUEVAS ([id number]) on 3/29/2023 12:17:34 PM    Referred By:             Confirmed By:Carlos Goode MD       Most Recent Echocardiogram Results  No results found for this or any previous visit.      Most Recent Nuclear Stress Test Results  No results found for this or any previous visit.      Most Recent Cardiac PET Stress Test Results  Results for orders placed during the hospital encounter of 02/08/23    Cardiac PET Scan Stress    Interpretation Summary    There are two significant perfusion abnormalities.    Perfusion Abnormality #1 - First, there is a large sized, mild to moderate intensity mid to apical anterior, anteroseptal, inferoapical and lateral apical resting perfusion abnormality involving 25% of LV myocardium in the typical distribution of the distal LAD territory. After pharmacologic stress, this defect improves, indicative " of non-transmural scar.    Within the perfusion abnormality, absolute myocardial perfusion (cc/min/gm) averaged 0.38 cc/min/g at rest, 0.78 cc/min/g at stress and CFR was 2.04 , which equates to mildly reduced coronary flow capacity within the LAD territory.    Perfusion Abnormality #2 - Second, there is a very small (<5%) sized, mild intensity basal to mid anterior resting perfusion abnormality involving 3% of LV myocardium in the typical distribution of the ramus territory. After pharmacologic stress, this defect improves, indicative of non-transmural scar.    There are no other significant perfusion abnormalities.    The whole heart absolute myocardial perfusion values averaged 0.56 cc/min/g at rest, which is reduced; 1.18 cc/min/g at stress, which is mildly reduced; and CFR is 2.14 , which is mildly reduced.    CT attenuation images demonstrate no coronary calcifications and no aortic calcifications.    The gated perfusion images showed an ejection fraction of 67% at rest and 75% during stress. A normal ejection fraction is greater than 47%.    The wall motion is normal at rest and during stress.    The LV cavity size is normal at rest and stress.    The ECG portion of the study is negative for ischemia.    During stress, rare PVCs are noted.    The patient reported chest pain during the stress test.    There are no prior studies for comparison.      Most Recent Cardiovascular Angiogram results  No results found for this or any previous visit.      Other Most Recent Cardiology Results  Results for orders placed in visit on 08/08/23    Cardiac device check - Remote    Narrative  Battery and Leads (BL)  Normal battery parameters    Presenting Rhythm (MS)  Sinus Bradycardia    Arrhythmic events (AE)  False event(s) recorded due to PAC(s)/SVT/PVC(s)/VT    Transmission Information (TI)  Device Summary Report  Implant indication: Atrial Fibrillation Management    Follow Up (FU)  Continue remote monitoring with monthly  reporting    Additional Comments  ILR Report.  Monitoring period:  06/19/23-07/20/23    Battery Status: OK      Assessment:       1. H/O cardiac radiofrequency ablation    2. S/P ablation of atrial flutter         Plan:     Symptoms OK today  BP/Pulse unable to be assessed on telemedicine visit  Most recent echocardiogram reviewed personally    Most recent cardiac PET stress reviewed personally   Had LHC in 2021 without obstructive CAD, had a bad reaction to it, thought she had a stroke, and hesitant to do another one   Need to consider cardiac syndrome X as well     Can consider Coronary CTA in the future if further ischemic eval is needed   Can consider Imdur 30 mg PO Daily in the future if chest pain recurs   Continue aspirin 81 mg PO Daily  Continue metoprolol tartrate 12.5 mg PO BID  If arrhythmia recurs, will need to restart propafenone   May consider EP study if symptoms worsen   Stay well hydrated    Continue other cardiac medications  Mediterranean Diet/Cardiovascular Exercise Program    Patient queried and all questions were answered.    F/u in 6-9 months to reassess, virtual OK    The patient location is: Home   The chief complaint leading to consultation is:  Please see problem list above  Visit type: Virtual visit with synchronous audio and video  Total time spent with patient: 15 minutes   Each patient to whom he or she provides medical services by telemedicine is:  (1) informed of the relationship between the physician and patient and the respective role of any other health care provider with respect to management of the patient; and (2) notified that he or she may decline to receive medical services by telemedicine and may withdraw from such care at any time.    Notes: See above       Signed:    Carlos Goode MD  8/3/2023 11:40 AM

## 2023-08-04 ENCOUNTER — OFFICE VISIT (OUTPATIENT)
Dept: CARDIOLOGY | Facility: CLINIC | Age: 43
End: 2023-08-04
Payer: COMMERCIAL

## 2023-08-04 DIAGNOSIS — Z98.890 S/P ABLATION OF ATRIAL FLUTTER: ICD-10-CM

## 2023-08-04 DIAGNOSIS — Z98.890 H/O CARDIAC RADIOFREQUENCY ABLATION: Primary | ICD-10-CM

## 2023-08-04 DIAGNOSIS — Z86.79 S/P ABLATION OF ATRIAL FLUTTER: ICD-10-CM

## 2023-08-04 PROCEDURE — 1160F RVW MEDS BY RX/DR IN RCRD: CPT | Mod: CPTII,95,, | Performed by: INTERNAL MEDICINE

## 2023-08-04 PROCEDURE — 99214 OFFICE O/P EST MOD 30 MIN: CPT | Mod: 95,,, | Performed by: INTERNAL MEDICINE

## 2023-08-04 PROCEDURE — 1159F PR MEDICATION LIST DOCUMENTED IN MEDICAL RECORD: ICD-10-PCS | Mod: CPTII,95,, | Performed by: INTERNAL MEDICINE

## 2023-08-04 PROCEDURE — 99214 PR OFFICE/OUTPT VISIT, EST, LEVL IV, 30-39 MIN: ICD-10-PCS | Mod: 95,,, | Performed by: INTERNAL MEDICINE

## 2023-08-04 PROCEDURE — 1159F MED LIST DOCD IN RCRD: CPT | Mod: CPTII,95,, | Performed by: INTERNAL MEDICINE

## 2023-08-04 PROCEDURE — 1160F PR REVIEW ALL MEDS BY PRESCRIBER/CLIN PHARMACIST DOCUMENTED: ICD-10-PCS | Mod: CPTII,95,, | Performed by: INTERNAL MEDICINE

## 2023-08-08 ENCOUNTER — CLINICAL SUPPORT (OUTPATIENT)
Dept: CARDIOLOGY | Facility: HOSPITAL | Age: 43
End: 2023-08-08
Payer: COMMERCIAL

## 2023-08-08 DIAGNOSIS — Z95.818 PRESENCE OF OTHER CARDIAC IMPLANTS AND GRAFTS: ICD-10-CM

## 2023-08-08 PROCEDURE — 93298 CARDIAC DEVICE CHECK - REMOTE: ICD-10-PCS | Mod: ,,, | Performed by: INTERNAL MEDICINE

## 2023-08-08 PROCEDURE — 93298 REM INTERROG DEV EVAL SCRMS: CPT | Mod: ,,, | Performed by: INTERNAL MEDICINE

## 2023-08-08 PROCEDURE — G2066 INTER DEVC REMOTE 30D: HCPCS | Mod: PO | Performed by: INTERNAL MEDICINE

## 2023-09-07 ENCOUNTER — CLINICAL SUPPORT (OUTPATIENT)
Dept: CARDIOLOGY | Facility: HOSPITAL | Age: 43
End: 2023-09-07
Payer: COMMERCIAL

## 2023-09-07 DIAGNOSIS — Z95.818 PRESENCE OF OTHER CARDIAC IMPLANTS AND GRAFTS: ICD-10-CM

## 2023-09-07 PROCEDURE — G2066 INTER DEVC REMOTE 30D: HCPCS | Mod: PO | Performed by: INTERNAL MEDICINE

## 2023-10-07 ENCOUNTER — CLINICAL SUPPORT (OUTPATIENT)
Dept: CARDIOLOGY | Facility: HOSPITAL | Age: 43
End: 2023-10-07
Payer: COMMERCIAL

## 2023-10-07 DIAGNOSIS — Z95.818 PRESENCE OF OTHER CARDIAC IMPLANTS AND GRAFTS: ICD-10-CM

## 2023-10-07 PROCEDURE — 93298 CARDIAC DEVICE CHECK - REMOTE: ICD-10-PCS | Mod: ,,, | Performed by: INTERNAL MEDICINE

## 2023-10-07 PROCEDURE — 93298 REM INTERROG DEV EVAL SCRMS: CPT | Mod: ,,, | Performed by: INTERNAL MEDICINE

## 2023-10-07 PROCEDURE — G2066 INTER DEVC REMOTE 30D: HCPCS | Mod: PO | Performed by: INTERNAL MEDICINE

## 2023-10-10 ENCOUNTER — TELEPHONE (OUTPATIENT)
Dept: SURGERY | Facility: CLINIC | Age: 43
End: 2023-10-10
Payer: COMMERCIAL

## 2023-10-11 ENCOUNTER — OFFICE VISIT (OUTPATIENT)
Dept: SURGERY | Facility: CLINIC | Age: 43
End: 2023-10-11
Payer: COMMERCIAL

## 2023-10-11 VITALS
OXYGEN SATURATION: 98 % | BODY MASS INDEX: 38.26 KG/M2 | WEIGHT: 252.44 LBS | HEART RATE: 60 BPM | SYSTOLIC BLOOD PRESSURE: 115 MMHG | DIASTOLIC BLOOD PRESSURE: 79 MMHG | HEIGHT: 68 IN | RESPIRATION RATE: 18 BRPM

## 2023-10-11 DIAGNOSIS — K92.1 BLOOD IN STOOL: Primary | ICD-10-CM

## 2023-10-11 PROCEDURE — 1160F PR REVIEW ALL MEDS BY PRESCRIBER/CLIN PHARMACIST DOCUMENTED: ICD-10-PCS | Mod: CPTII,S$GLB,, | Performed by: COLON & RECTAL SURGERY

## 2023-10-11 PROCEDURE — 3074F SYST BP LT 130 MM HG: CPT | Mod: CPTII,S$GLB,, | Performed by: COLON & RECTAL SURGERY

## 2023-10-11 PROCEDURE — 3008F PR BODY MASS INDEX (BMI) DOCUMENTED: ICD-10-PCS | Mod: CPTII,S$GLB,, | Performed by: COLON & RECTAL SURGERY

## 2023-10-11 PROCEDURE — 3008F BODY MASS INDEX DOCD: CPT | Mod: CPTII,S$GLB,, | Performed by: COLON & RECTAL SURGERY

## 2023-10-11 PROCEDURE — 3078F PR MOST RECENT DIASTOLIC BLOOD PRESSURE < 80 MM HG: ICD-10-PCS | Mod: CPTII,S$GLB,, | Performed by: COLON & RECTAL SURGERY

## 2023-10-11 PROCEDURE — 1159F MED LIST DOCD IN RCRD: CPT | Mod: CPTII,S$GLB,, | Performed by: COLON & RECTAL SURGERY

## 2023-10-11 PROCEDURE — 99999 PR PBB SHADOW E&M-EST. PATIENT-LVL III: ICD-10-PCS | Mod: PBBFAC,,, | Performed by: COLON & RECTAL SURGERY

## 2023-10-11 PROCEDURE — 99203 PR OFFICE/OUTPT VISIT, NEW, LEVL III, 30-44 MIN: ICD-10-PCS | Mod: S$GLB,,, | Performed by: COLON & RECTAL SURGERY

## 2023-10-11 PROCEDURE — 99203 OFFICE O/P NEW LOW 30 MIN: CPT | Mod: S$GLB,,, | Performed by: COLON & RECTAL SURGERY

## 2023-10-11 PROCEDURE — 1160F RVW MEDS BY RX/DR IN RCRD: CPT | Mod: CPTII,S$GLB,, | Performed by: COLON & RECTAL SURGERY

## 2023-10-11 PROCEDURE — 3078F DIAST BP <80 MM HG: CPT | Mod: CPTII,S$GLB,, | Performed by: COLON & RECTAL SURGERY

## 2023-10-11 PROCEDURE — 3074F PR MOST RECENT SYSTOLIC BLOOD PRESSURE < 130 MM HG: ICD-10-PCS | Mod: CPTII,S$GLB,, | Performed by: COLON & RECTAL SURGERY

## 2023-10-11 PROCEDURE — 1159F PR MEDICATION LIST DOCUMENTED IN MEDICAL RECORD: ICD-10-PCS | Mod: CPTII,S$GLB,, | Performed by: COLON & RECTAL SURGERY

## 2023-10-11 PROCEDURE — 99999 PR PBB SHADOW E&M-EST. PATIENT-LVL III: CPT | Mod: PBBFAC,,, | Performed by: COLON & RECTAL SURGERY

## 2023-10-11 NOTE — PROGRESS NOTES
"CRS Office Visit History and Physical      SUBJECTIVE:     Chief Complaint: Anal pain and bleeding    History of Present Illness:  The patient is new patient to this practice.   Course is as follows:  Patient is a 43 y.o. female presents with anal pain and bleeding.  Usually happens with her cycle. Hemorrhoids do swell. + bleeding. Lasts for about a week. This recent episode lasted 1 month.  Symptoms have been present for several years.  Has tried OTC hemorrhoid cream. Helps with pain but not pressure.  Previous anorectal procedures: No  Had lots of swelling after her 2nd delivery. Had ?excision of thrombosed external.  denies straining/prolonged time on toilet with bowel movements.  is not currently taking fiber supplement of stool softener  Blood thinners: No (Aspirin)  Saw Stacie Rand in 2017 for pruritis    Last Colonoscopy:   Family history of colorectal cancer or IBD: Mother.    Review of patient's allergies indicates:   Allergen Reactions    Sulfa (sulfonamide antibiotics)     Sulfa (sulfonamide antibiotics) Hives and Anxiety       Past Medical History:   Diagnosis Date    Atrial flutter     PVC (premature ventricular contraction)     SVT (supraventricular tachycardia)      Past Surgical History:   Procedure Laterality Date     SECTION      loop recorder      SHOULDER SURGERY       No family history on file.  Social History     Tobacco Use    Smokeless tobacco: Never   Substance Use Topics    Alcohol use: No        Review of Systems:  ROS    OBJECTIVE:     Vital Signs (Most Recent)  /79   Pulse 60   Resp 18   Ht 5' 8" (1.727 m)   Wt 114.5 kg (252 lb 6.8 oz)   SpO2 98%   BMI 38.38 kg/m²     Physical Exam:  General: White female in no distress   Neuro: Alert and oriented x 4.  Moves all extremities.     HEENT: No icterus.  Trachea midline  Respiratory: Respirations are even and unlabored  Cardiac: Regular rate  Extremities: Warm dry and intact  Skin: No rashes    Anorectal:   External " exam: Perianal skin excoriation, circumferential soft skin tags, no clear fissure, no thrombosed hemorrhoids  Digital exam revealed normal tone. Some tenderness. No masses.    Anoscopy:  Deferred    ASSESSMENT/PLAN:     42yo F w/ hemorrhoid swelling and bleeding    The patient was instructed to:  Increase water intake to at least 8-10 glasses of water per day.  Take a daily fiber supplement (Konsyl, Benefiber, Metamucil) and increase dietary intake to 20-30 grams/day.  Avoid straining or spending >5min/event with bowel movements.  Use a bidet to clean as well as a topical cream such as Calmoseptine  Schedule colonoscopy (high-risk given mother's history), possible banding at that time. Message sent to schedulers.    Latonia Cruz MD  Staff Surgeon, Colon and Rectal Surgery  [facility]

## 2023-10-13 ENCOUNTER — PATIENT MESSAGE (OUTPATIENT)
Dept: SURGERY | Facility: CLINIC | Age: 43
End: 2023-10-13
Payer: COMMERCIAL

## 2023-10-13 ENCOUNTER — TELEPHONE (OUTPATIENT)
Dept: SURGERY | Facility: CLINIC | Age: 43
End: 2023-10-13
Payer: COMMERCIAL

## 2023-10-13 NOTE — TELEPHONE ENCOUNTER
----- Message from Amanda Atkinson sent at 10/13/2023 12:47 PM CDT -----  Regarding: pt adivce  Contact: [phone]  Pt calling in regards to scheduling colonoscopy, pt needing order to be scheduled. Pls call

## 2023-10-14 ENCOUNTER — TELEPHONE (OUTPATIENT)
Dept: ENDOSCOPY | Facility: HOSPITAL | Age: 43
End: 2023-10-14
Payer: COMMERCIAL

## 2023-10-14 VITALS — WEIGHT: 240 LBS | BODY MASS INDEX: 35.55 KG/M2 | HEIGHT: 69 IN

## 2023-10-14 DIAGNOSIS — Z12.11 SCREEN FOR COLON CANCER: Primary | ICD-10-CM

## 2023-10-14 NOTE — TELEPHONE ENCOUNTER
"----- Message from Michelle Alamo sent at 10/12/2023  8:56 AM CDT -----    ----- Message -----  From: Latonia Cruz MD  Sent: 10/11/2023   3:00 PM CDT  To: [facility]    Procedure: Colonoscopy (possible banding)    Diagnosis: Screening colonoscopy    Procedure Timin-12 weeks    #If within 4 weeks selected, please angie as high priority#    #If greater than 12 weeks, please select "5-12 weeks" and delay sending until 3 months prior to requested date#     Provider: Myself    Location: [address]    Additional Scheduling Information: No scheduling concerns    Prep Specifications:Standard prep    Is the patient taking a GLP-1 Agonist:no    Have you attached a patient to this message: yes       "

## 2023-10-14 NOTE — TELEPHONE ENCOUNTER
Spoke to Caroline to schedule procedure(s) Colonoscopy       Physician to perform procedure(s) Dr. CLAUDE Cruz  Date of Procedure (s) 12/18/23  Arrival Time 11:30 AM  Time of Procedure(s) 12:30 AM   Location of Procedure(s) [facility]  Type of Rx Prep sent to patient: PEG  Instructions provided to patient via [facility]    Patient was informed on the following information and verbalized understanding. Screening questionnaire reviewed with patient and complete. If procedure requires anesthesia, a responsible adult needs to be present to accompany the patient home, patient cannot drive after receiving anesthesia. Appointment details are tentative, especially check-in time. Patient will receive a prep-op call 4 days prior to confirm check-in time for procedure. If applicable the patient should contact their pharmacy to verify Rx for procedure prep is ready for pick-up. Patient was advised to call the scheduling department at [phone] if pharmacy states no Rx is available. Patient was advised to call the endoscopy scheduling department if any questions or concerns arise.      [facility]oscopy Scheduling Department

## 2023-10-27 ENCOUNTER — PATIENT MESSAGE (OUTPATIENT)
Dept: CARDIOLOGY | Facility: CLINIC | Age: 43
End: 2023-10-27
Payer: COMMERCIAL

## 2023-11-15 ENCOUNTER — CLINICAL SUPPORT (OUTPATIENT)
Dept: CARDIOLOGY | Facility: HOSPITAL | Age: 43
End: 2023-11-15
Attending: INTERNAL MEDICINE
Payer: COMMERCIAL

## 2023-11-15 ENCOUNTER — CLINICAL SUPPORT (OUTPATIENT)
Dept: CARDIOLOGY | Facility: HOSPITAL | Age: 43
End: 2023-11-15
Payer: COMMERCIAL

## 2023-11-15 DIAGNOSIS — I49.8 OTHER SPECIFIED CARDIAC ARRHYTHMIAS: ICD-10-CM

## 2023-11-15 PROCEDURE — 93298 REM INTERROG DEV EVAL SCRMS: CPT | Mod: ,,, | Performed by: INTERNAL MEDICINE

## 2023-11-15 PROCEDURE — 93298 CARDIAC DEVICE CHECK - REMOTE: ICD-10-PCS | Mod: ,,, | Performed by: INTERNAL MEDICINE

## 2023-11-15 PROCEDURE — G2066 INTER DEVC REMOTE 30D: HCPCS | Mod: PO | Performed by: INTERNAL MEDICINE

## 2023-11-17 LAB
OHS CV AF BURDEN PERCENT: < 1
OHS CV DC REMOTE DEVICE TYPE: NORMAL

## 2023-12-11 DIAGNOSIS — Z12.11 SPECIAL SCREENING FOR MALIGNANT NEOPLASMS, COLON: Primary | ICD-10-CM

## 2023-12-11 RX ORDER — POLYETHYLENE GLYCOL 3350, SODIUM SULFATE ANHYDROUS, SODIUM BICARBONATE, SODIUM CHLORIDE, POTASSIUM CHLORIDE 236; 22.74; 6.74; 5.86; 2.97 G/4L; G/4L; G/4L; G/4L; G/4L
4 POWDER, FOR SOLUTION ORAL ONCE
Qty: 4000 ML | Refills: 0 | Status: SHIPPED | OUTPATIENT
Start: 2023-12-11 | End: 2023-12-11

## 2023-12-15 ENCOUNTER — CLINICAL SUPPORT (OUTPATIENT)
Dept: CARDIOLOGY | Facility: HOSPITAL | Age: 43
End: 2023-12-15
Payer: COMMERCIAL

## 2023-12-15 ENCOUNTER — CLINICAL SUPPORT (OUTPATIENT)
Dept: CARDIOLOGY | Facility: HOSPITAL | Age: 43
End: 2023-12-15
Attending: INTERNAL MEDICINE
Payer: COMMERCIAL

## 2023-12-15 ENCOUNTER — TELEPHONE (OUTPATIENT)
Dept: CARDIOLOGY | Facility: HOSPITAL | Age: 43
End: 2023-12-15
Payer: COMMERCIAL

## 2023-12-15 DIAGNOSIS — I49.8 OTHER SPECIFIED CARDIAC ARRHYTHMIAS: ICD-10-CM

## 2023-12-15 PROCEDURE — G2066 INTER DEVC REMOTE 30D: HCPCS | Mod: PO | Performed by: INTERNAL MEDICINE

## 2023-12-16 ENCOUNTER — CLINICAL SUPPORT (OUTPATIENT)
Dept: CARDIOLOGY | Facility: HOSPITAL | Age: 43
End: 2023-12-16
Attending: INTERNAL MEDICINE
Payer: COMMERCIAL

## 2023-12-16 PROCEDURE — 93298 CARDIAC DEVICE CHECK - REMOTE: ICD-10-PCS | Mod: ,,, | Performed by: INTERNAL MEDICINE

## 2023-12-16 PROCEDURE — 93298 REM INTERROG DEV EVAL SCRMS: CPT | Mod: ,,, | Performed by: INTERNAL MEDICINE

## 2023-12-18 ENCOUNTER — HOSPITAL ENCOUNTER (OUTPATIENT)
Facility: HOSPITAL | Age: 43
Discharge: HOME | End: 2023-12-18
Attending: COLON & RECTAL SURGERY | Admitting: COLON & RECTAL SURGERY
Payer: COMMERCIAL

## 2023-12-18 ENCOUNTER — ANESTHESIA EVENT (OUTPATIENT)
Dept: ENDOSCOPY | Facility: HOSPITAL | Age: 43
End: 2023-12-18
Payer: COMMERCIAL

## 2023-12-18 ENCOUNTER — ANESTHESIA (OUTPATIENT)
Dept: ENDOSCOPY | Facility: HOSPITAL | Age: 43
End: 2023-12-18
Payer: COMMERCIAL

## 2023-12-18 VITALS
DIASTOLIC BLOOD PRESSURE: 60 MMHG | SYSTOLIC BLOOD PRESSURE: 111 MMHG | OXYGEN SATURATION: 99 % | HEART RATE: 56 BPM | TEMPERATURE: 98 F | RESPIRATION RATE: 18 BRPM

## 2023-12-18 DIAGNOSIS — Z12.11 SCREENING FOR MALIGNANT NEOPLASM OF COLON: Primary | ICD-10-CM

## 2023-12-18 LAB
B-HCG UR QL: NEGATIVE
CTP QC/QA: YES

## 2023-12-18 PROCEDURE — 46221 LIGATION OF HEMORRHOID(S): CPT | Mod: 51,,, | Performed by: COLON & RECTAL SURGERY

## 2023-12-18 PROCEDURE — E9220 PRA ENDO ANESTHESIA: ICD-10-PCS | Mod: 33,,, | Performed by: NURSE ANESTHETIST, CERTIFIED REGISTERED

## 2023-12-18 PROCEDURE — 37000009 HC ANESTHESIA EA ADD 15 MINS: Performed by: COLON & RECTAL SURGERY

## 2023-12-18 PROCEDURE — 45385 COLONOSCOPY W/LESION REMOVAL: CPT | Performed by: COLON & RECTAL SURGERY

## 2023-12-18 PROCEDURE — 88305 TISSUE EXAM BY PATHOLOGIST: CPT | Performed by: STUDENT IN AN ORGANIZED HEALTH CARE EDUCATION/TRAINING PROGRAM

## 2023-12-18 PROCEDURE — 37000008 HC ANESTHESIA 1ST 15 MINUTES: Performed by: COLON & RECTAL SURGERY

## 2023-12-18 PROCEDURE — 63600175 PHARM REV CODE 636 W HCPCS: Performed by: NURSE ANESTHETIST, CERTIFIED REGISTERED

## 2023-12-18 PROCEDURE — 81025 URINE PREGNANCY TEST: CPT | Performed by: COLON & RECTAL SURGERY

## 2023-12-18 PROCEDURE — 88305 TISSUE EXAM BY PATHOLOGIST: CPT | Mod: 26,,, | Performed by: STUDENT IN AN ORGANIZED HEALTH CARE EDUCATION/TRAINING PROGRAM

## 2023-12-18 PROCEDURE — 46221 PR HEMORRHOIDECTOMY INTERNAL RUBBER BAND LIGATIONS: ICD-10-PCS | Mod: 51,,, | Performed by: COLON & RECTAL SURGERY

## 2023-12-18 PROCEDURE — 88305 TISSUE EXAM BY PATHOLOGIST: ICD-10-PCS | Mod: 26,,, | Performed by: STUDENT IN AN ORGANIZED HEALTH CARE EDUCATION/TRAINING PROGRAM

## 2023-12-18 PROCEDURE — 45385 COLONOSCOPY W/LESION REMOVAL: CPT | Mod: ,,, | Performed by: COLON & RECTAL SURGERY

## 2023-12-18 PROCEDURE — E9220 PRA ENDO ANESTHESIA: HCPCS | Mod: 33,,, | Performed by: NURSE ANESTHETIST, CERTIFIED REGISTERED

## 2023-12-18 PROCEDURE — 45385 PR COLONOSCOPY,REMV LESN,SNARE: ICD-10-PCS | Mod: ,,, | Performed by: COLON & RECTAL SURGERY

## 2023-12-18 PROCEDURE — 46221 LIGATION OF HEMORRHOID(S): CPT | Performed by: COLON & RECTAL SURGERY

## 2023-12-18 PROCEDURE — 25000003 PHARM REV CODE 250: Performed by: NURSE ANESTHETIST, CERTIFIED REGISTERED

## 2023-12-18 RX ORDER — SODIUM CHLORIDE 9 MG/ML
INJECTION, SOLUTION INTRAVENOUS CONTINUOUS
Status: DISCONTINUED | OUTPATIENT
Start: 2023-12-18 | End: 2023-12-18 | Stop reason: HOSPADM

## 2023-12-18 RX ORDER — LIDOCAINE HYDROCHLORIDE 20 MG/ML
INJECTION, SOLUTION EPIDURAL; INFILTRATION; INTRACAUDAL; PERINEURAL
Status: DISCONTINUED | OUTPATIENT
Start: 2023-12-18 | End: 2023-12-18

## 2023-12-18 RX ORDER — PROPOFOL 10 MG/ML
VIAL (ML) INTRAVENOUS
Status: DISCONTINUED | OUTPATIENT
Start: 2023-12-18 | End: 2023-12-18

## 2023-12-18 RX ORDER — FENTANYL CITRATE 50 UG/ML
INJECTION, SOLUTION INTRAMUSCULAR; INTRAVENOUS
Status: DISCONTINUED | OUTPATIENT
Start: 2023-12-18 | End: 2023-12-18

## 2023-12-18 RX ORDER — PHENYLEPHRINE HYDROCHLORIDE 10 MG/ML
INJECTION INTRAVENOUS
Status: DISCONTINUED | OUTPATIENT
Start: 2023-12-18 | End: 2023-12-18

## 2023-12-18 RX ADMIN — PROPOFOL 100 MG: 10 INJECTION, EMULSION INTRAVENOUS at 12:12

## 2023-12-18 RX ADMIN — PHENYLEPHRINE HYDROCHLORIDE 100 MCG: 10 INJECTION INTRAVENOUS at 01:12

## 2023-12-18 RX ADMIN — FENTANYL CITRATE 25 MCG: 50 INJECTION, SOLUTION INTRAMUSCULAR; INTRAVENOUS at 01:12

## 2023-12-18 RX ADMIN — SODIUM CHLORIDE: 0.9 INJECTION, SOLUTION INTRAVENOUS at 12:12

## 2023-12-18 RX ADMIN — FENTANYL CITRATE 50 MCG: 50 INJECTION, SOLUTION INTRAMUSCULAR; INTRAVENOUS at 01:12

## 2023-12-18 RX ADMIN — PHENYLEPHRINE HYDROCHLORIDE 100 MCG: 10 INJECTION INTRAVENOUS at 12:12

## 2023-12-18 RX ADMIN — LIDOCAINE HYDROCHLORIDE 100 MG: 20 INJECTION, SOLUTION EPIDURAL; INFILTRATION; INTRACAUDAL; PERINEURAL at 12:12

## 2023-12-18 NOTE — TRANSFER OF CARE
Anesthesia Transfer of Care Note    Patient: Caroline Hartman    Procedure(s) Performed: Procedure(s) (LRB):  COLONOSCOPY (N/A)    Patient location: PACU    Anesthesia Type: general    Transport from OR: Transported from OR on room air with adequate spontaneous ventilation    Post pain: adequate analgesia    Post assessment: no apparent anesthetic complications and tolerated procedure well    Post vital signs: stable    Level of consciousness: awake, alert and oriented    Nausea/Vomiting: no nausea/vomiting    Complications: none    Transfer of care protocol was followed      Last vitals:

## 2023-12-18 NOTE — ANESTHESIA POSTPROCEDURE EVALUATION
Anesthesia Post Evaluation    Patient: Caroline Hartman    Procedure(s) Performed: Procedure(s) (LRB):  COLONOSCOPY (N/A)    Final Anesthesia Type: general      Patient location during evaluation: GI PACU  Patient participation: Yes- Able to Participate  Level of consciousness: awake and alert  Post-procedure vital signs: reviewed and stable  Pain management: adequate  Airway patency: patent    PONV status at discharge: No PONV  Anesthetic complications: no      Cardiovascular status: blood pressure returned to baseline  Respiratory status: unassisted, spontaneous ventilation and room air  Hydration status: euvolemic                Vitals Value Taken Time   /60 12/18/23 1400   Temp 36.7 °C (98 °F) 12/18/23 1330   Pulse 56 12/18/23 1400   Resp 18 12/18/23 1400   SpO2 99 % 12/18/23 1400         Event Time   Out of Recovery 14:07:14         Pain/Dante Score: Dante Score: 10 (12/18/2023  1:45 PM)

## 2023-12-18 NOTE — ANESTHESIA PREPROCEDURE EVALUATION
2023  Caroline Hartman is a 43 y.o., female.    [facility]  Anesthesia Pre-Operative Evaluation       Patient Name: Caroline Hartman  YOB: 1980  MRN: [id number]  [facility]: [id number]      Code Status: No Order   Date of Procedure: 2023  Anesthesia: Choice Procedure: Procedure(s) (LRB):  COLONOSCOPY (N/A)  Pre-Operative Diagnosis: Screen for colon cancer [Z12.11]  Proceduralist: Surgeon(s) and Role:     * Latonia Cruz MD - Primary Registered Nurse: Barbara Lopez RN      SUBJECTIVE:   Caroline Hartman is a 43 y.o. female who is here today for Procedure(s) (LRB):  COLONOSCOPY (N/A).   No notes on file    Anticoagulants   Medication Route Frequency     she has a current medication list which includes the following long-term medication(s): aspirin, metoprolol tartrate, and pantoprazole.   ALLERGIES:     Review of patient's allergies indicates:   Allergen Reactions    Sulfa (sulfonamide antibiotics)     Sulfa (sulfonamide antibiotics) Hives and Anxiety     LDA:          Lines/Drains/Airways       Peripheral Intravenous Line  Duration                  Peripheral IV - Single Lumen 17 1617 Left Antecubital 2184 days                  History:   There are no hospital problems to display for this patient.    Patient Active Problem List   Diagnosis    S/P ablation of atrial flutter    H/O cardiac radiofrequency ablation     Medical History   Past Medical History:   Diagnosis Date    Atrial flutter     PVC (premature ventricular contraction)     SVT (supraventricular tachycardia)      Surgical History:    has a past surgical history that includes loop recorder;  section; and Shoulder surgery.   Social History:    reports that she does not have a smoking history on file. She has never used smokeless tobacco. She reports that she does not drink alcohol.     OBJECTIVE:  "    Vital Signs (Most Recent):    Vital Signs Range (Last 24H):        Last 3 Vitals:       3/29/2023     9:27 AM 10/11/2023     2:29 PM 10/14/2023    11:00 AM   Vitals - 1 value per visit   SYSTOLIC 114 115    DIASTOLIC 78 79    Pulse 58 60    Resp  18    SPO2  98 %    Weight (lb) 246.03 252.43 240   Weight (kg) 111.6 114.5 108.863   Height 5' 8" (1.727 m) 5' 8" (1.727 m) 5' 9" (1.753 m)   BMI (Calculated) 37.4 38.4 35.4   Pain Score Zero Six      There is no height or weight on file to calculate BMI.   Wt Readings from Last 4 Encounters:   10/14/23 108.9 kg (240 lb)   10/11/23 114.5 kg (252 lb 6.8 oz)   03/29/23 111.6 kg (246 lb 0.5 oz)   02/08/23 108.9 kg (240 lb)       EKG:   Results for orders placed or performed in visit on 03/29/23   IN OFFICE EKG 12-LEAD (to [place])    Collection Time: 03/29/23  9:33 AM    Narrative    Test Reason : Z98.890,Z98.890,Z86.79,R94.39,    Vent. Rate : 067 BPM     Atrial Rate : 067 BPM     P-R Int : 170 ms          QRS Dur : 078 ms      QT Int : 404 ms       P-R-T Axes : 050 083 047 degrees     QTc Int : 426 ms    Normal sinus rhythm  Normal ECG  When compared with ECG of 08-FEB-2023 08:32,  Previous ECG has undetermined rhythm, needs review  Confirmed by Russel SHANKAR, Carlos CUEVAS ([id number]) on 3/29/2023 12:17:34 PM    Referred By:             Confirmed By:Carlos Goode MD       TTE:  No results found for this or any previous visit.  Nuc Stress EF   Date Value Ref Range Status   02/08/2023 75 % Final     Nuc Rest EF   Date Value Ref Range Status   02/08/2023 67  Final          ASSESSMENT/PLAN:       Pre-op Assessment    I have reviewed the Patient Summary Reports.     I have reviewed the Nursing Notes. I have reviewed the NPO Status.   I have reviewed the Medications.     Review of Systems  Cardiovascular:                       Atrial Flutter         Physical Exam  General: Well nourished, Cooperative and Alert    Airway:  Mallampati: III / II  Mouth Opening: Normal  TM Distance: " Normal  Tongue: Normal  Neck ROM: Normal ROM    Dental:  Intact    Chest/Lungs:  Normal Respiratory Rate    Heart:  Rate: Normal  Rhythm: Regular Rhythm        Anesthesia Plan  Type of Anesthesia, risks & benefits discussed:    Anesthesia Type: Gen Natural Airway, MAC  Intra-op Monitoring Plan: Standard ASA Monitors  Post Op Pain Control Plan: IV/PO Opioids PRN  Induction:  IV  Informed Consent: Informed consent signed with the Patient and all parties understand the risks and agree with anesthesia plan.  All questions answered.   ASA Score: 1  Day of Surgery Review of History & Physical: H&P Update referred to the surgeon/provider.    Ready For Surgery From Anesthesia Perspective.     .

## 2023-12-18 NOTE — H&P
COLONOSCOPY HISTORY AND PHYSICAL EXAM    Procedure : Colonoscopy      INDICATIONS: rectal bleeding and family history of colon cancer     Family Hx of CRC: Mother    Last Colonoscopy:  2011       Past Medical History:   Diagnosis Date    Atrial flutter     PVC (premature ventricular contraction)     SVT (supraventricular tachycardia)      Sedation Problems: NO  History reviewed. No pertinent family history.  Fam Hx of Sedation Problems: NO  Social History     Socioeconomic History    Marital status:    Tobacco Use    Smokeless tobacco: Never   Substance and Sexual Activity    Alcohol use: No   Social History Narrative    ** Merged History Encounter **            Review of Systems - Negative except   Respiratory ROS: no dyspnea  Cardiovascular ROS: no exertional chest pain  Gastrointestinal ROS: NO abdominal discomfort,  NO rectal bleeding  Musculoskeletal ROS: no muscular pain  Neurological ROS: no recent stroke    Physical Exam:  Breastfeeding No   General: no distress  Head: normocephalic  Mallampati Score   Neck: supple, symmetrical, trachea midline  Lungs:  clear to auscultation bilaterally and normal respiratory effort  Heart: regular rate and rhythm and no murmur  Abdomen: soft, non-tender non-distented; bowel sounds normal; no masses,  no organomegaly  Extremities: no cyanosis or edema, or clubbing    ASA:  II    PLAN  COLONOSCOPY.    SedationPlan :MAC    The details of the procedure, the possible need for biopsy or polypectomy and the potential risks including bleeding, perforation, missed polyps were discussed in detail.

## 2023-12-20 LAB
FINAL PATHOLOGIC DIAGNOSIS: NORMAL
GROSS: NORMAL
Lab: NORMAL
OHS CV AF BURDEN PERCENT: < 1
OHS CV DC REMOTE DEVICE TYPE: NORMAL
OHS CV ICD SHOCK: NO

## 2023-12-26 LAB
OHS CV AF BURDEN PERCENT: < 1
OHS CV DC REMOTE DEVICE TYPE: NORMAL

## 2024-01-14 ENCOUNTER — CLINICAL SUPPORT (OUTPATIENT)
Dept: CARDIOLOGY | Facility: HOSPITAL | Age: 44
End: 2024-01-14
Payer: COMMERCIAL

## 2024-01-14 DIAGNOSIS — I49.8 OTHER SPECIFIED CARDIAC ARRHYTHMIAS: ICD-10-CM

## 2024-01-16 ENCOUNTER — HOSPITAL ENCOUNTER (OUTPATIENT)
Dept: CARDIOLOGY | Facility: HOSPITAL | Age: 44
Discharge: HOME | End: 2024-01-16
Attending: INTERNAL MEDICINE
Payer: COMMERCIAL

## 2024-01-16 PROCEDURE — 93298 REM INTERROG DEV EVAL SCRMS: CPT | Mod: 26,ICN,, | Performed by: INTERNAL MEDICINE

## 2024-01-30 ENCOUNTER — TELEPHONE (OUTPATIENT)
Dept: CARDIOLOGY | Facility: CLINIC | Age: 44
End: 2024-01-30
Payer: COMMERCIAL

## 2024-02-01 LAB
OHS CV AF BURDEN PERCENT: < 1
OHS CV DC REMOTE DEVICE TYPE: NORMAL
OHS CV ICD SHOCK: NO

## 2024-03-20 DIAGNOSIS — Z00.00 ANNUAL PHYSICAL EXAM: ICD-10-CM

## 2024-03-20 DIAGNOSIS — Z98.890 S/P ABLATION OF ATRIAL FLUTTER: ICD-10-CM

## 2024-03-20 DIAGNOSIS — Z86.79 S/P ABLATION OF ATRIAL FLUTTER: ICD-10-CM

## 2024-03-20 DIAGNOSIS — Z98.890 H/O CARDIAC RADIOFREQUENCY ABLATION: ICD-10-CM

## 2024-03-20 RX ORDER — METOPROLOL TARTRATE 25 MG/1
25 TABLET, FILM COATED ORAL 2 TIMES DAILY
Qty: 180 TABLET | Refills: 3 | Status: SHIPPED | OUTPATIENT
Start: 2024-03-20

## 2024-08-19 NOTE — PROGRESS NOTES
"Subjective     HPI    I had the pleasure of seeing Caroline Hartman in consultation at your request for the evaluation of atrial arrhythmias. She is a 44F with a history of atrial flutter who reportedly underwent ablation with Dr. Bliss around 2012. About 1 year later she reportedly had an ablation for SVT and PVCs with Dr. Bliss. She also has an ILR in place. At her last check in our EMR (1/2024) no true AF episodes had been seen. Device also noted to be at BOGDAN/EOS.    Cardiac PET in 2/2023 showed " . . . two significant perfusion abnormalities.Perfusion Abnormality #1 - First, there is a large sized, mild to moderate intensity mid to apical anterior, anteroseptal, inferoapical and lateral apical resting perfusion abnormality involving 25% of LV myocardium in the typical distribution of the distal LAD territory. After pharmacologic stress, this defect improves, indicative of non-transmural scar. Perfusion Abnormality #2 - Second, there is a very small (<5%) sized, mild intensity basal to mid anterior resting perfusion abnormality involving 3% of LV myocardium in the typical distribution of the ramus territory. After pharmacologic stress, this defect improves, indicative of non-transmural scar." Pt states she had LHC around 2020 which showed no CAD ([facility]).     My interpretation of today's ECG is sinus rhythm 63 bpm.    Review of Systems   Constitutional: Negative for decreased appetite, malaise/fatigue, weight gain and weight loss.   HENT:  Negative for sore throat.    Eyes:  Negative for blurred vision.   Cardiovascular:  Positive for chest pain. Negative for dyspnea on exertion, irregular heartbeat, leg swelling, near-syncope, orthopnea, palpitations, paroxysmal nocturnal dyspnea and syncope.   Respiratory:  Negative for shortness of breath.    Skin:  Negative for rash.   Musculoskeletal:  Negative for arthritis.   Gastrointestinal:  Negative for abdominal pain.   Neurological:  Negative for focal " weakness.   Psychiatric/Behavioral:  Negative for altered mental status.           Objective     Physical Exam  Vitals and nursing note reviewed.   Constitutional:       General: She is not in acute distress.     Appearance: She is well-developed.   HENT:      Head: Normocephalic and atraumatic.   Eyes:      General: No scleral icterus.     Conjunctiva/sclera: Conjunctivae normal.      Pupils: Pupils are equal, round, and reactive to light.   Neck:      Thyroid: No thyromegaly.      Vascular: No JVD.   Cardiovascular:      Rate and Rhythm: Normal rate and regular rhythm.      Pulses: Intact distal pulses.      Heart sounds: Normal heart sounds. No murmur heard.     No friction rub. No gallop.   Pulmonary:      Effort: Pulmonary effort is normal. No respiratory distress.      Breath sounds: Normal breath sounds.   Abdominal:      General: Bowel sounds are normal. There is no distension.      Palpations: Abdomen is soft.   Musculoskeletal:      Cervical back: Normal range of motion and neck supple.   Skin:     General: Skin is warm and dry.   Neurological:      Mental Status: She is alert and oriented to person, place, and time.   Psychiatric:         Behavior: Behavior normal.        Assessment and Plan     1. H/O cardiac radiofrequency ablation    2. S/P ablation of atrial flutter        Plan:     In summary, Caroline Hartman is a 44F with a history of atrial flutter status-post ablation by Dr. Bliss, ILR in situ with no arrhythmias noted since ablation, with device now dead.     Pt would prefer ILR removal. Risks/benefits discussed and she has agreed to proceed. Will perform with sedation given on exam it feels like it is quite deep in the soft tissue.     Also instructed pt to buy Smartwatch or [employer] to allow for home rhythm monitoring.    Thank you for allowing me to participate in the care of this patient. Please do not hesitate to call me with any questions or concerns.

## 2024-08-21 ENCOUNTER — OFFICE VISIT (OUTPATIENT)
Dept: CARDIOLOGY | Facility: CLINIC | Age: 44
End: 2024-08-21
Payer: COMMERCIAL

## 2024-08-21 ENCOUNTER — PATIENT MESSAGE (OUTPATIENT)
Dept: CARDIOLOGY | Facility: CLINIC | Age: 44
End: 2024-08-21

## 2024-08-21 ENCOUNTER — TELEPHONE (OUTPATIENT)
Dept: CARDIOLOGY | Facility: CLINIC | Age: 44
End: 2024-08-21

## 2024-08-21 VITALS
HEIGHT: 69 IN | DIASTOLIC BLOOD PRESSURE: 66 MMHG | WEIGHT: 244.25 LBS | SYSTOLIC BLOOD PRESSURE: 105 MMHG | HEART RATE: 68 BPM | BODY MASS INDEX: 36.18 KG/M2

## 2024-08-21 VITALS
HEART RATE: 61 BPM | HEIGHT: 69 IN | DIASTOLIC BLOOD PRESSURE: 86 MMHG | WEIGHT: 240.31 LBS | SYSTOLIC BLOOD PRESSURE: 135 MMHG | BODY MASS INDEX: 35.59 KG/M2

## 2024-08-21 DIAGNOSIS — Z98.890 S/P ABLATION OF ATRIAL FLUTTER: Primary | ICD-10-CM

## 2024-08-21 DIAGNOSIS — Z13.6 ENCOUNTER FOR SCREENING FOR CARDIOVASCULAR DISORDERS: Primary | ICD-10-CM

## 2024-08-21 DIAGNOSIS — Z98.890 H/O CARDIAC RADIOFREQUENCY ABLATION: ICD-10-CM

## 2024-08-21 DIAGNOSIS — R07.9 CHEST PAIN, UNSPECIFIED TYPE: ICD-10-CM

## 2024-08-21 DIAGNOSIS — Z86.79 S/P ABLATION OF ATRIAL FLUTTER: Primary | ICD-10-CM

## 2024-08-21 DIAGNOSIS — Z98.890 H/O CARDIAC RADIOFREQUENCY ABLATION: Primary | ICD-10-CM

## 2024-08-21 DIAGNOSIS — Z98.890 S/P ABLATION OF ATRIAL FLUTTER: ICD-10-CM

## 2024-08-21 DIAGNOSIS — Z13.220 SCREENING CHOLESTEROL LEVEL: ICD-10-CM

## 2024-08-21 DIAGNOSIS — Z86.79 S/P ABLATION OF ATRIAL FLUTTER: ICD-10-CM

## 2024-08-21 LAB
OHS QRS DURATION: 86 MS
OHS QTC CALCULATION: 411 MS

## 2024-08-21 PROCEDURE — 3074F SYST BP LT 130 MM HG: CPT | Mod: CPTII,S$GLB,,

## 2024-08-21 PROCEDURE — 3075F SYST BP GE 130 - 139MM HG: CPT | Mod: CPTII,S$GLB,, | Performed by: INTERNAL MEDICINE

## 2024-08-21 PROCEDURE — 1159F MED LIST DOCD IN RCRD: CPT | Mod: CPTII,S$GLB,, | Performed by: INTERNAL MEDICINE

## 2024-08-21 PROCEDURE — 3008F BODY MASS INDEX DOCD: CPT | Mod: CPTII,S$GLB,, | Performed by: INTERNAL MEDICINE

## 2024-08-21 PROCEDURE — 1159F MED LIST DOCD IN RCRD: CPT | Mod: CPTII,S$GLB,,

## 2024-08-21 PROCEDURE — 3008F BODY MASS INDEX DOCD: CPT | Mod: CPTII,S$GLB,,

## 2024-08-21 PROCEDURE — 99999 PR PBB SHADOW E&M-EST. PATIENT-LVL III: CPT | Mod: PBBFAC,,,

## 2024-08-21 PROCEDURE — 99214 OFFICE O/P EST MOD 30 MIN: CPT | Mod: S$GLB,,,

## 2024-08-21 PROCEDURE — 3078F DIAST BP <80 MM HG: CPT | Mod: CPTII,S$GLB,,

## 2024-08-21 PROCEDURE — 3079F DIAST BP 80-89 MM HG: CPT | Mod: CPTII,S$GLB,, | Performed by: INTERNAL MEDICINE

## 2024-08-21 PROCEDURE — 99999 PR PBB SHADOW E&M-EST. PATIENT-LVL III: CPT | Mod: PBBFAC,,, | Performed by: INTERNAL MEDICINE

## 2024-08-21 PROCEDURE — 99205 OFFICE O/P NEW HI 60 MIN: CPT | Mod: S$GLB,,, | Performed by: INTERNAL MEDICINE

## 2024-08-21 PROCEDURE — 93010 ELECTROCARDIOGRAM REPORT: CPT | Mod: S$GLB,,, | Performed by: INTERNAL MEDICINE

## 2024-08-21 PROCEDURE — 93005 ELECTROCARDIOGRAM TRACING: CPT | Mod: S$GLB,,, | Performed by: INTERNAL MEDICINE

## 2024-08-21 RX ORDER — RANOLAZINE 500 MG/1
500 TABLET, EXTENDED RELEASE ORAL 2 TIMES DAILY
Qty: 60 TABLET | Refills: 11 | Status: SHIPPED | OUTPATIENT
Start: 2024-08-21 | End: 2025-08-21

## 2024-08-21 NOTE — PROGRESS NOTES
Subjective:    Patient ID:  Caroline Hartman is a 44 y.o. female patient here for evaluation No chief complaint on file.    History of Present Illness:     Caroline Hartman is a 45 y/o female who follows with Dr. Goode and Dr. Stock () here today for follow up with complaints of angina. Last seen 08/2023 by Dr. Goode. She saw Dr. Stock today, complained of angina. EKG with no evidence of ischemia. They discussed removal of ILR (end of battery life, no arrhythmias since implant). She currently denies CP, SOB, dizziness, palpitations, and activity intolerance. She reports monthly episodes of mild chest pain occasionally associated with dizziness and 3-4 episodes of severe chest pain with dizziness per year. The episodes are usually associated with exertion, although she reports episodes at rest. Of note, she had a LHC in 2021 and reports having a bad reaction (numbness and tingling in face, pounding headache). Dr. Goode mentioned Coronary CTA if chest pain persists, which we discussed and she is agreeable to proceed.     Discussed risk factor modification. She states that she has not had labs done in >2 years and has not established care with PCP.     Focused Past History includes:   Chest pain  LHC 2021 -- no evidence of obstructive CAD   Nuclear stress test 2022 with large anterior fixed defect   Echo 06/2022 -- EF normal, mild MR  Cardiac PET 02/2023 -- negative for reversible ischemia  Atrial flutter   Ablation in 2013  Rate control -- metoprolol succinate 25mg BID  ILR implanted 9/2020  No arrhythmias since implantation  Scheduled ILR removal procedure   SVT  Ablation in 2014  No issues since          Most Recent Echocardiogram Results  No results found for this or any previous visit.      Most Recent Nuclear Stress Test Results  No results found for this or any previous visit.      Most Recent Cardiac PET Stress Test Results  Results for orders placed during the hospital encounter of  02/08/23    Cardiac PET Scan Stress    Interpretation Summary    There are two significant perfusion abnormalities.    Perfusion Abnormality #1 - First, there is a large sized, mild to moderate intensity mid to apical anterior, anteroseptal, inferoapical and lateral apical resting perfusion abnormality involving 25% of LV myocardium in the typical distribution of the distal LAD territory. After pharmacologic stress, this defect improves, indicative of non-transmural scar.    Within the perfusion abnormality, absolute myocardial perfusion (cc/min/gm) averaged 0.38 cc/min/g at rest, 0.78 cc/min/g at stress and CFR was 2.04 , which equates to mildly reduced coronary flow capacity within the LAD territory.    Perfusion Abnormality #2 - Second, there is a very small (<5%) sized, mild intensity basal to mid anterior resting perfusion abnormality involving 3% of LV myocardium in the typical distribution of the ramus territory. After pharmacologic stress, this defect improves, indicative of non-transmural scar.    There are no other significant perfusion abnormalities.    The whole heart absolute myocardial perfusion values averaged 0.56 cc/min/g at rest, which is reduced; 1.18 cc/min/g at stress, which is mildly reduced; and CFR is 2.14 , which is mildly reduced.    CT attenuation images demonstrate no coronary calcifications and no aortic calcifications.    The gated perfusion images showed an ejection fraction of 67% at rest and 75% during stress. A normal ejection fraction is greater than 47%.    The wall motion is normal at rest and during stress.    The LV cavity size is normal at rest and stress.    The ECG portion of the study is negative for ischemia.    During stress, rare PVCs are noted.    The patient reported chest pain during the stress test.    There are no prior studies for comparison.      Most Recent Cardiovascular Angiogram results  No results found for this or any previous visit.      Other Most Recent  Cardiology Results  Results for orders placed in visit on 24    Cardiac device check - Remote alert      REVIEW OF SYSTEMS: As noted in HPI   CARDIOVASCULAR: Chest pain. No recent palpitations, arm/neck/jaw pain, or edema.  RESPIRATORY: No recent fever, cough, SOB.  : No blood in the urine  GI: No reflux, nausea, vomiting, or blood in stool.   MUSCULOSKELETAL: No falls.   NEURO: Dizziness. No headaches, syncope, or .  EYES: No sudden changes in vision.     Past Medical History:   Diagnosis Date    Atrial flutter     PVC (premature ventricular contraction)     SVT (supraventricular tachycardia)      Past Surgical History:   Procedure Laterality Date     SECTION      COLONOSCOPY N/A 2023    Procedure: COLONOSCOPY;  Surgeon: Latonia Cruz MD;  Location: [address]);  Service: Endoscopy;  Laterality: N/A;  Ref By: Nancy  Prep Specifications: peg   Route instructions sent: via portal  loop recorder -[employer]  -precall complete-MS  -precall complete-MS    loop recorder      SHOULDER SURGERY       Social History     Tobacco Use    Smokeless tobacco: Never   Substance Use Topics    Alcohol use: No         Objective    There were no vitals filed for this visit.    LAST EKG  Results for orders placed or performed in visit on 24   IN OFFICE EKG 12-LEAD (to [place])    Collection Time: 24  9:50 AM   Result Value Ref Range    QRS Duration 86 ms    OHS QTC Calculation 411 ms    Narrative    Test Reason : Z98.890,Z98.890,Z86.79,    Vent. Rate : 063 BPM     Atrial Rate : 063 BPM     P-R Int : 172 ms          QRS Dur : 086 ms      QT Int : 402 ms       P-R-T Axes : 033 048 023 degrees     QTc Int : 411 ms    Normal sinus rhythm  Normal ECG  When compared with ECG of 29-MAR-2023 09:33,  No significant change was found  Confirmed by Jacky Myers MD ([id number]) on 2024 11:04:52 AM    Referred By: [email or web address]   SELF           Confirmed By:Jacky Myers MD     LIPIDS - LAST 2   No  "results found for: "CHOL", "HDL", "LDLCALC", "TRIG", "CHOLHDL"  CARDIAC PROFILE - LAST 2  No results found for: "BNP", "CPK", "CPKMB", "LDH", "TROPONINI"   CBC - LAST 2  No results found for: "WBC", "HGB", "HCT", "PLT"  No results found for: "LABPT", "INR", "APTT"  CHEMISTRY - LAST 2  No results found for: "NA", "K", "CO2", "BUN", "CREATININE", "GLU", "CALCIUM", "PH", "MG", "ALBUMIN", "ALT", "AST"   ENDOCRINE - LAST 2  No results found for: "HGBA1C", "TSH"     PHYSICAL EXAM  CONSTITUTIONAL: Well built, well nourished in no apparent distress  NECK: no carotid bruit, no JVD  LUNGS: CTA  CHEST WALL: no tenderness  HEART: regular rate and rhythm, S1, S2 normal, no murmur, click, rub or gallop   ABDOMEN: soft, non-tender; bowel sounds normal; no masses,  no organomegaly  EXTREMITIES: Extremities normal, no edema, no calf tenderness noted  NEURO: AAO X 3    I HAVE REVIEWED :    The vital signs, most recent cardiac testing, and most recent pertinent non-cardiology provider notes.    Current Outpatient Medications   Medication Instructions    aspirin (ECOTRIN) 81 mg, Oral, Daily    metoprolol tartrate (LOPRESSOR) 25 mg, Oral, 2 times daily    naproxen (NAPROSYN) 500 mg, Oral, Every 12 hours PRN        Assessment & Plan   1. Chest pain      2. Atrial flutter S/P ablation       3. SVT s/p ablation         PLAN:     Start ranolazine 500mg BID  Reduce metoprolol tartrate to 12.5mg BID    Lipid panel, A1C  Echocardiogram  Coronary CTA    Continue aspirin 81mg daily    Low-cholesterol diet  Regular exercise program        Follow up in about 4 months (around 12/21/2024).     [facility]   Office: [phone]  "

## 2024-08-21 NOTE — TELEPHONE ENCOUNTER
Discussed plan of care with patient. Instructed her to schedule the Echocardiogram and Coronary CTA with our office. She verbalized understanding.

## 2024-08-24 ENCOUNTER — PATIENT MESSAGE (OUTPATIENT)
Dept: CARDIOLOGY | Facility: CLINIC | Age: 44
End: 2024-08-24
Payer: COMMERCIAL

## 2024-09-05 ENCOUNTER — TELEPHONE (OUTPATIENT)
Dept: CARDIOLOGY | Facility: CLINIC | Age: 44
End: 2024-09-05
Payer: COMMERCIAL

## 2024-09-05 NOTE — TELEPHONE ENCOUNTER
----- Message from Eddie De Dios sent at 9/5/2024  1:53 PM CDT -----  Regarding: Appt request  Type:  Appointment Request    Caller is requesting an appointment.     Name of Caller:  Pt    Symptoms:      Would the patient rather a call back or a response via [email or web address]? Call back    Best Call Back Number:  [phone]      Additional Information:  Sts she needs to schedule her Echo/ loop recorder removal/ and cardiac Ct scoring.   Please advise -- Thank you

## 2024-09-06 ENCOUNTER — PATIENT MESSAGE (OUTPATIENT)
Dept: CARDIOLOGY | Facility: CLINIC | Age: 44
End: 2024-09-06
Payer: COMMERCIAL

## 2024-09-06 DIAGNOSIS — Z45.09 ENCOUNTER FOR LOOP RECORDER AT END OF BATTERY LIFE: Primary | ICD-10-CM

## 2024-09-06 NOTE — PROGRESS NOTES
Loop Recorder Removal    Arrive for your procedure at: [facility] on 9/13/24 at 9 am. Your procedure is at 10 am. Enter through the main entrance and check in at the reception desk. They will direct you upstairs to the cath lab.    NO PREP IS NECESSARY    The Pyroliatronic loop recorder monitors a persons heart rate 24 hours a day and has a battery life of up to 3 years.  The device is approximately one-third of the size of a AAA battery and is also safe for use in an MRI.  It is placed beneath the skin through a small incision in the left upper side of the chest wall.  The procedure is minimally invasive so anesthesia is not needed to implant the device.  The procedure can be done in about 10 minutes.      You may eat or drink the day of the procedure.    Please take all of your medications on the day of scheduled procedure.    This WILL NOT require anesthesia or an IV.    You DO NOT need someone to drive you home.

## 2024-09-12 ENCOUNTER — HOSPITAL ENCOUNTER (OUTPATIENT)
Dept: CARDIOLOGY | Facility: HOSPITAL | Age: 44
Discharge: HOME | End: 2024-09-12
Payer: COMMERCIAL

## 2024-09-12 VITALS — HEIGHT: 69 IN | BODY MASS INDEX: 35.55 KG/M2 | WEIGHT: 240 LBS

## 2024-09-12 DIAGNOSIS — R07.9 CHEST PAIN, UNSPECIFIED TYPE: ICD-10-CM

## 2024-09-12 LAB
ASCENDING AORTA: 2.7 CM
AV INDEX (PROSTH): 0.84
AV MEAN GRADIENT: 3 MMHG
AV PEAK GRADIENT: 5 MMHG
AV VALVE AREA BY VELOCITY RATIO: 3.76 CM²
AV VALVE AREA: 3.58 CM²
AV VELOCITY RATIO: 0.88
BSA FOR ECHO PROCEDURE: 2.3 M2
CV ECHO LV RWT: 0.36 CM
DOP CALC AO PEAK VEL: 1.11 M/S
DOP CALC AO VTI: 27 CM
DOP CALC LVOT AREA: 4.3 CM2
DOP CALC LVOT DIAMETER: 2.33 CM
DOP CALC LVOT PEAK VEL: 0.98 M/S
DOP CALC LVOT STROKE VOLUME: 96.74 CM3
DOP CALCLVOT PEAK VEL VTI: 22.7 CM
E WAVE DECELERATION TIME: 211.77 MSEC
E/A RATIO: 1.58
E/E' RATIO: 10.86 M/S
ECHO LV POSTERIOR WALL: 0.95 CM (ref 0.6–1.1)
EJECTION FRACTION: 60 %
FRACTIONAL SHORTENING: 40 % (ref 28–44)
INTERVENTRICULAR SEPTUM: 0.95 CM (ref 0.6–1.1)
IVRT: 98.95 MSEC
LEFT ATRIUM AREA SYSTOLIC (APICAL 2 CHAMBER): 17.44 CM2
LEFT ATRIUM AREA SYSTOLIC (APICAL 4 CHAMBER): 15.13 CM2
LEFT ATRIUM SIZE: 3.43 CM
LEFT ATRIUM VOLUME INDEX MOD: 20.1 ML/M2
LEFT ATRIUM VOLUME MOD: 44.76 CM3
LEFT INTERNAL DIMENSION IN SYSTOLE: 3.17 CM (ref 2.1–4)
LEFT VENTRICLE DIASTOLIC VOLUME INDEX: 60.02 ML/M2
LEFT VENTRICLE DIASTOLIC VOLUME: 133.85 ML
LEFT VENTRICLE END SYSTOLIC VOLUME APICAL 2 CHAMBER: 51.04 ML
LEFT VENTRICLE END SYSTOLIC VOLUME APICAL 4 CHAMBER: 38.42 ML
LEFT VENTRICLE MASS INDEX: 83 G/M2
LEFT VENTRICLE SYSTOLIC VOLUME INDEX: 18 ML/M2
LEFT VENTRICLE SYSTOLIC VOLUME: 40.06 ML
LEFT VENTRICULAR INTERNAL DIMENSION IN DIASTOLE: 5.27 CM (ref 3.5–6)
LEFT VENTRICULAR MASS: 185.5 G
LV LATERAL E/E' RATIO: 19 M/S
LV SEPTAL E/E' RATIO: 7.6 M/S
LVED V (TEICH): 133.85 ML
LVES V (TEICH): 40.06 ML
LVOT MG: 2.14 MMHG
LVOT MV: 0.69 CM/S
MV PEAK A VEL: 0.48 M/S
MV PEAK E VEL: 0.76 M/S
OHS CV RV/LV RATIO: 0.63 CM
PISA TR MAX VEL: 2.06 M/S
PULM VEIN S/D RATIO: 1.21
PV PEAK D VEL: 0.43 M/S
PV PEAK S VEL: 0.52 M/S
RA PRESSURE ESTIMATED: 3 MMHG
RA VOL SYS: 25.01 ML
RIGHT ATRIAL AREA: 11.5 CM2
RIGHT ATRIUM VOLUME AREA LENGTH APICAL 4 CHAMBER: 23.96 ML
RIGHT VENTRICLE DIASTOLIC LENGTH: 6.8 CM
RIGHT VENTRICLE DIASTOLIC MID DIMENSION: 2.5 CM
RIGHT VENTRICULAR END-DIASTOLIC DIMENSION: 3.34 CM
RIGHT VENTRICULAR LENGTH IN DIASTOLE (APICAL 4-CHAMBER VIEW): 6.84 CM
RV MID DIAMA: 2.54 CM
RV TB RVSP: 5 MMHG
RV TISSUE DOPPLER FREE WALL SYSTOLIC VELOCITY 1 (APICAL 4 CHAMBER VIEW): 12.87 CM/S
SINUS: 2.81 CM
STJ: 2.75 CM
TDI LATERAL: 0.04 M/S
TDI SEPTAL: 0.1 M/S
TDI: 0.07 M/S
TR MAX PG: 17 MMHG
TRICUSPID ANNULAR PLANE SYSTOLIC EXCURSION: 1.84 CM
TV REST PULMONARY ARTERY PRESSURE: 20 MMHG
Z-SCORE OF LEFT VENTRICULAR DIMENSION IN END DIASTOLE: -4.02
Z-SCORE OF LEFT VENTRICULAR DIMENSION IN END SYSTOLE: -3.24

## 2024-09-12 PROCEDURE — 93306 TTE W/DOPPLER COMPLETE: CPT | Mod: PO

## 2024-09-12 PROCEDURE — 93306 TTE W/DOPPLER COMPLETE: CPT | Mod: 26,,, | Performed by: INTERNAL MEDICINE

## 2024-09-13 ENCOUNTER — PATIENT MESSAGE (OUTPATIENT)
Dept: CARDIOLOGY | Facility: CLINIC | Age: 44
End: 2024-09-13

## 2024-09-13 PROBLEM — Z45.09 ENCOUNTER FOR LOOP RECORDER AT END OF BATTERY LIFE: Status: ACTIVE | Noted: 2024-09-13

## 2024-09-30 DIAGNOSIS — M25.571 RIGHT ANKLE PAIN, UNSPECIFIED CHRONICITY: Primary | ICD-10-CM

## 2024-10-01 ENCOUNTER — OFFICE VISIT (OUTPATIENT)
Dept: ORTHOPEDICS | Facility: CLINIC | Age: 44
End: 2024-10-01
Payer: COMMERCIAL

## 2024-10-01 ENCOUNTER — HOSPITAL ENCOUNTER (OUTPATIENT)
Dept: RADIOLOGY | Facility: HOSPITAL | Age: 44
Discharge: HOME | End: 2024-10-01
Attending: ORTHOPAEDIC SURGERY
Payer: COMMERCIAL

## 2024-10-01 DIAGNOSIS — S86.311A STRAIN OF MUSCLE(S) AND TENDON(S) OF PERONEAL MUSCLE GROUP AT LOWER LEG LEVEL, RIGHT LEG, INITIAL ENCOUNTER: ICD-10-CM

## 2024-10-01 DIAGNOSIS — S93.491A SPRAIN OF ANTERIOR TALOFIBULAR LIGAMENT OF RIGHT ANKLE, INITIAL ENCOUNTER: Primary | ICD-10-CM

## 2024-10-01 DIAGNOSIS — M25.571 RIGHT ANKLE PAIN, UNSPECIFIED CHRONICITY: ICD-10-CM

## 2024-10-01 PROCEDURE — 1160F RVW MEDS BY RX/DR IN RCRD: CPT | Mod: CPTII,S$GLB,, | Performed by: ORTHOPAEDIC SURGERY

## 2024-10-01 PROCEDURE — 99999 PR PBB SHADOW E&M-EST. PATIENT-LVL II: CPT | Mod: PBBFAC,,, | Performed by: ORTHOPAEDIC SURGERY

## 2024-10-01 PROCEDURE — 73630 X-RAY EXAM OF FOOT: CPT | Mod: TC,PO,RT

## 2024-10-01 PROCEDURE — 73630 X-RAY EXAM OF FOOT: CPT | Mod: 26,RT,, | Performed by: RADIOLOGY

## 2024-10-01 PROCEDURE — 99203 OFFICE O/P NEW LOW 30 MIN: CPT | Mod: S$GLB,,, | Performed by: ORTHOPAEDIC SURGERY

## 2024-10-01 PROCEDURE — 1159F MED LIST DOCD IN RCRD: CPT | Mod: CPTII,S$GLB,, | Performed by: ORTHOPAEDIC SURGERY

## 2024-10-01 PROCEDURE — 73610 X-RAY EXAM OF ANKLE: CPT | Mod: TC,PO,RT

## 2024-10-01 PROCEDURE — 3044F HG A1C LEVEL LT 7.0%: CPT | Mod: CPTII,S$GLB,, | Performed by: ORTHOPAEDIC SURGERY

## 2024-10-01 PROCEDURE — 73610 X-RAY EXAM OF ANKLE: CPT | Mod: 26,RT,, | Performed by: RADIOLOGY

## 2024-10-01 NOTE — PROGRESS NOTES
Status/Diagnosis: Subtle cavus; Right ankle ATFL sprain; peroneal tendon strain.  Date of Surgery:   Right brostrom lateral ligament repair and 5th MT fracture ORIF by Dr. Holland () with subsequent Right 5th MT HWR.  Date of Injury: none; DOO 2024  Return visit: 2 weeks  X-rays on Return: none    Chief Complaint:   Chief Complaint   Patient presents with    Right Ankle - Pain, Swelling     Present History:  [facility]   Caroline Hartman is a 44 y.o. female who presents today for new patient evaluation.  Endorses a 5 day history of acute onset right lateral ankle/hindfoot pain.  No known history of injury or other inciting event.    Past surgical history is significant for previous right ankle lateral ligament repair and Murray fracture performed by Dr. Holland in .  Subsequent right 5th metatarsal hardware removal shortly thereafter.    10 pain today.  No complaints of subjective ankle instability.  Patient was up on her feet constantly.  Works in the while live in [employer].  Currently taking over-the-counter oral NSAIDs with mild relief.  No recent history of boot/brace immobilization, physical therapy, etc.   Denies any numbness or tingling.  Past medical history significant for atrial flutter and arrhythmia.  Denies tobacco use.  Remains active.      Past Medical History:   Diagnosis Date    Atrial flutter     Encounter for loop recorder at end of battery life     History of angina     PVC (premature ventricular contraction)     SVT (supraventricular tachycardia)        Past Surgical History:   Procedure Laterality Date     SECTION      COLONOSCOPY N/A 2023    Procedure: COLONOSCOPY;  Surgeon: Latonia Cruz MD;  Location: [facility] ([address] [place] [address]);  Service: Endoscopy;  Laterality: N/A;  Ref By: Nancy  Prep Specifications: peg   Route instructions sent: via portal  loop recorder -[employer]  [facility]  complete-MS  12/11-precall complete-MS    loop recorder      REMOVAL OF IMPLANTABLE LOOP RECORDER  9/13/2024    Procedure: Removal Loop recorder;  Surgeon: Magdiel Tate MD;  Location: [facility];  Service: Cardiology;;    SHOULDER SURGERY         Current Outpatient Medications   Medication Sig    aspirin (ECOTRIN) 81 MG EC tablet Take 81 mg by mouth once daily.    metoprolol tartrate (LOPRESSOR) 25 MG tablet TAKE 1 TABLET(25 MG) BY MOUTH TWICE DAILY    naproxen (NAPROSYN) 500 MG tablet Take 1 tablet (500 mg total) by mouth every 12 (twelve) hours as needed.    ranolazine (RANEXA) 500 MG Tb12 Take 1 tablet (500 mg total) by mouth 2 (two) times daily.     No current facility-administered medications for this visit.       Review of patient's allergies indicates:   Allergen Reactions    Shrimp Itching    Sulfa (sulfonamide antibiotics)     Sulfa (sulfonamide antibiotics) Hives and Anxiety       No family history on file.    Social History     Socioeconomic History    Marital status:    Tobacco Use    Smoking status: Never    Smokeless tobacco: Never   Substance and Sexual Activity    Alcohol use: No    Drug use: Never   Social History Narrative    ** Merged History Encounter **          Social Drivers of Health     Financial Resource Strain: Low Risk  (8/21/2024)    Overall Financial Resource Strain (CARDIA)     Difficulty of Paying Living Expenses: Not hard at all   Food Insecurity: No Food Insecurity (8/21/2024)    Hunger Vital Sign     Worried About Running Out of Food in the Last Year: Never true     Ran Out of Food in the Last Year: Never true   Physical Activity: Sufficiently Active (8/21/2024)    Exercise Vital Sign     Days of Exercise per Week: 4 days     Minutes of Exercise per Session: 60 min   Stress: No Stress Concern Present (8/21/2024)    [employer] - Occupational Stress Questionnaire     Feeling of Stress : Only a little   Housing Stability: Unknown  (8/21/2024)    Housing Stability Vital Sign     Unable to Pay for Housing in the Last Year: No       Physical exam:  There were no vitals filed for this visit.  There is no height or weight on file to calculate BMI.  General: In no apparent distress; well developed and well nourished.  HEENT: normocephalic; atraumatic.  Cardiovascular: regular rate.  Respiratory: no increased work of breathing.  Musculoskeletal:   Gait: mild antalgic  Inspection:   Subtle cavus bilaterally.    Previous surgical scars are all well healed.  Patient localizes pain over the ATFL and along the course of the peroneals spanning from the groove to the tubercle.    No medial based ankle swelling or tenderness.  Equivocal laxity on anterior drawer and varus/valgus talar tilt testing.    No pain referable to the foot.  Pain or laxity with Lisfranc stress.  Silfverskiold: Negative  Alignment:  Knee: neutral               Ankle: neutral              Hindfoot: subtle cavus              Forefoot: neutral   Strength:              Dorsiflexion 5/5  Plantar flexion 5/5  Inversion 5/5  Eversion 5/5  Sensation:              SILT distally  ROM:              Ankle: near full with pain at extremes              Subtalar: near full with pain at extremes  Pulses: Palpable pedal pulse                   Imaging Studies/Outside documentation:  I have ordered/reviewed/interpreted the following images/outside documentation:  1. Weight-bearing 3-views of Right foot and ankle:   On my independent review, no acute bony abnormality noted.  Evidence of previous 5th metatarsal base fracture, well healed with some heterotopic ossification of the peroneus brevis insertion.  Increased calcaneal pitch with talonavicular over coverage.  Bipartite tibial sesamoid.  Ankle mortise remains congruent.  Hindfoot varus best seen on AP and mortise ankle views.        Assessment:  Caroline Hartman is a 44 y.o. female with Subtle cavus; Right ankle ATFL sprain; peroneal tendon  strain.     Plan:   Clinical and radiographic findings were discussed.  Recommend conservative management to include a short course boot immobilization.  Patient was boot previous surgical procedures.    She may weightbear as tolerated right lower extremity.    Discussed rest, ice, compression elevation, over-the-counter oral NSAIDs as needed for pain.    Patient to remain on light duty for her job until being seen again in 2 weeks' time.    Repeat clinical evaluation.  No new x-ray.    Based on patient progress, initiate physical therapy versus MRI for further evaluation.    Patient voiced understanding.  All questions were answered.    This note was created using voice recognition software and may contain grammatical errors.

## 2024-10-07 ENCOUNTER — PATIENT MESSAGE (OUTPATIENT)
Dept: ORTHOPEDICS | Facility: CLINIC | Age: 44
End: 2024-10-07
Payer: COMMERCIAL

## 2024-10-08 DIAGNOSIS — M25.571 PAIN IN JOINT INVOLVING RIGHT ANKLE AND FOOT: Primary | ICD-10-CM

## 2024-10-09 ENCOUNTER — PATIENT MESSAGE (OUTPATIENT)
Dept: CARDIOLOGY | Facility: CLINIC | Age: 44
End: 2024-10-09
Payer: COMMERCIAL

## 2024-10-09 PROBLEM — R07.9 CHEST PAIN: Status: ACTIVE | Noted: 2024-10-09

## 2024-10-11 ENCOUNTER — HOSPITAL ENCOUNTER (OUTPATIENT)
Dept: RADIOLOGY | Facility: HOSPITAL | Age: 44
Discharge: HOME | End: 2024-10-11
Attending: ORTHOPAEDIC SURGERY
Payer: COMMERCIAL

## 2024-10-11 DIAGNOSIS — M25.571 PAIN IN JOINT INVOLVING RIGHT ANKLE AND FOOT: ICD-10-CM

## 2024-10-11 PROCEDURE — 73721 MRI JNT OF LWR EXTRE W/O DYE: CPT | Mod: TC,PO,RT

## 2024-10-11 PROCEDURE — 73721 MRI JNT OF LWR EXTRE W/O DYE: CPT | Mod: 26,RT,, | Performed by: RADIOLOGY

## 2024-10-15 ENCOUNTER — OFFICE VISIT (OUTPATIENT)
Dept: ORTHOPEDICS | Facility: CLINIC | Age: 44
End: 2024-10-15
Payer: COMMERCIAL

## 2024-10-15 DIAGNOSIS — S93.491A SPRAIN OF ANTERIOR TALOFIBULAR LIGAMENT OF RIGHT ANKLE, INITIAL ENCOUNTER: Primary | ICD-10-CM

## 2024-10-15 DIAGNOSIS — S86.311A STRAIN OF MUSCLE(S) AND TENDON(S) OF PERONEAL MUSCLE GROUP AT LOWER LEG LEVEL, RIGHT LEG, INITIAL ENCOUNTER: ICD-10-CM

## 2024-10-15 PROCEDURE — 3044F HG A1C LEVEL LT 7.0%: CPT | Mod: CPTII,S$GLB,, | Performed by: ORTHOPAEDIC SURGERY

## 2024-10-15 PROCEDURE — 99999 PR PBB SHADOW E&M-EST. PATIENT-LVL III: CPT | Mod: PBBFAC,,, | Performed by: ORTHOPAEDIC SURGERY

## 2024-10-15 PROCEDURE — 1159F MED LIST DOCD IN RCRD: CPT | Mod: CPTII,S$GLB,, | Performed by: ORTHOPAEDIC SURGERY

## 2024-10-15 PROCEDURE — 99214 OFFICE O/P EST MOD 30 MIN: CPT | Mod: S$GLB,,, | Performed by: ORTHOPAEDIC SURGERY

## 2024-10-15 PROCEDURE — 1160F RVW MEDS BY RX/DR IN RCRD: CPT | Mod: CPTII,S$GLB,, | Performed by: ORTHOPAEDIC SURGERY

## 2024-10-15 NOTE — PROGRESS NOTES
Status/Diagnosis: Subtle cavus; Right ankle ATFL sprain; peroneal tendon strain.  Date of Surgery:   Right brostrom lateral ligament repair and 5th MT fracture ORIF by Dr. Holland () with subsequent Right 5th MT HWR.  Date of Injury: none; DOO 2024  Return visit: PRN  X-rays on Return: pending patient complaint    Chief Complaint:   Chief Complaint   Patient presents with    Right Ankle - Pain, Injury     Present History:  Patient presents today via referral from No ref. provider found   Caroline Hartman is a 44 y.o. female who presents today for new patient evaluation.  Endorses a 5 day history of acute onset right lateral ankle/hindfoot pain.  No known history of injury or other inciting event.    Past surgical history is significant for previous right ankle lateral ligament repair and Murray fracture performed by Dr. Holland in .  Subsequent right 5th metatarsal hardware removal shortly thereafter.    7/10 pain today.  No complaints of subjective ankle instability.  Patient was up on her feet constantly.  Works in the while live in [employer].  Currently taking over-the-counter oral NSAIDs with mild relief.  No recent history of boot/brace immobilization, physical therapy, etc.   Denies any numbness or tingling.  Past medical history significant for atrial flutter and arrhythmia.  Denies tobacco use.  Remains active.    10/15/2024:  Patient returns today for repeat clinical evaluation and MRI results.  Overall with moderate clinical improvement since last being seen.  Tall cam boot has provided significant relief.  5/10 pain.  Weightbearing as tolerated.  Reports she already obtain a lace-up ankle brace.      Past Medical History:   Diagnosis Date    Atrial flutter     Encounter for loop recorder at end of battery life     History of angina     PVC (premature ventricular contraction)     SVT (supraventricular tachycardia)        Past Surgical History:   Procedure Laterality Date      SECTION      COLONOSCOPY N/A 12/18/2023    Procedure: COLONOSCOPY;  Surgeon: Latonia Cruz MD;  Location: [facility] (4TH FLR);  Service: Endoscopy;  Laterality: N/A;  Ref By: Nancy  Prep Specifications: peg   Route instructions sent: via portal  loop recorder -medtronic  12/11-precall complete-MS  12/11-precall complete-MS    loop recorder      REMOVAL OF IMPLANTABLE LOOP RECORDER  9/13/2024    Procedure: Removal Loop recorder;  Surgeon: Magdiel Tate MD;  Location: [facility];  Service: Cardiology;;    SHOULDER SURGERY         Current Outpatient Medications   Medication Sig    aspirin (ECOTRIN) 81 MG EC tablet Take 81 mg by mouth once daily.    metoprolol tartrate (LOPRESSOR) 25 MG tablet TAKE 1 TABLET(25 MG) BY MOUTH TWICE DAILY    naproxen (NAPROSYN) 500 MG tablet Take 1 tablet (500 mg total) by mouth every 12 (twelve) hours as needed.    ranolazine (RANEXA) 500 MG Tb12 Take 1 tablet (500 mg total) by mouth 2 (two) times daily.     No current facility-administered medications for this visit.       Review of patient's allergies indicates:   Allergen Reactions    Shrimp Itching    Sulfa (sulfonamide antibiotics)     Sulfa (sulfonamide antibiotics) Hives and Anxiety       No family history on file.    Social History     Socioeconomic History    Marital status:    Tobacco Use    Smoking status: Never    Smokeless tobacco: Never   Substance and Sexual Activity    Alcohol use: No    Drug use: Never   Social History Narrative    ** Merged History Encounter **          Social Drivers of Health     Financial Resource Strain: Low Risk  (8/21/2024)    Overall Financial Resource Strain (CARDIA)     Difficulty of Paying Living Expenses: Not hard at all   Food Insecurity: No Food Insecurity (8/21/2024)    Hunger Vital Sign     Worried About Running Out of Food in the Last Year: Never true     Ran Out of Food in the Last Year: Never true   Physical Activity: Sufficiently Active (8/21/2024)    Exercise  Vital Sign     Days of Exercise per Week: 4 days     Minutes of Exercise per Session: 60 min   Stress: No Stress Concern Present (8/21/2024)    [place] [employer] - Occupational Stress Questionnaire     Feeling of Stress : Only a little   Housing Stability: Unknown (8/21/2024)    Housing Stability Vital Sign     Unable to Pay for Housing in the Last Year: No       Physical exam:  There were no vitals filed for this visit.  There is no height or weight on file to calculate BMI.  General: In no apparent distress; well developed and well nourished.  HEENT: normocephalic; atraumatic.  Cardiovascular: regular rate.  Respiratory: no increased work of breathing.  Musculoskeletal:   Gait:  Nonantalgic  Inspection:   Subtle cavus bilaterally.    Previous surgical scars are all well healed.    Overall moderate improvement versus new patient evaluation.  Still localizes mild-to-moderate pain over the ATFL and along the course of the peroneals spanning from the groove to the tubercle.    No medial based ankle swelling or tenderness.  Equivocal laxity on anterior drawer and varus talar tilt testing.  Mild-to-moderate pain with extremes of varus talar tilt testing.  No pain referable to the foot.  Pain or laxity with Lisfranc stress.  Silfverskiold: Negative  Alignment:  Knee: neutral               Ankle: neutral              Hindfoot: subtle cavus              Forefoot: neutral   Strength:              Dorsiflexion 5/5  Plantar flexion 5/5  Inversion 5/5  Eversion 5/5  Sensation:              SILT distally  ROM:              Ankle: near full with pain at extremes              Subtalar: near full with pain at extremes  Pulses: Palpable pedal pulse                   Imaging Studies/Outside documentation:  I have ordered/reviewed/interpreted the following images/outside documentation:  1. Weight-bearing 3-views of Right foot and ankle:   On my independent review, no acute bony abnormality noted.  Evidence of  previous 5th metatarsal base fracture, well healed with some heterotopic ossification of the peroneus brevis insertion.  Increased calcaneal pitch with talonavicular over coverage.  Bipartite tibial sesamoid.  Ankle mortise remains congruent.  Hindfoot varus best seen on AP and mortise ankle views.    2. MRI Right ankle w/o contrast on 10/11/2024:  On my independent review, no osteochondral lesion noted.  Difficult visualization of the ATFL with washout appearance.  Confirmed 7 x 4 mm oval T2 hyperintense and T1 hypointense structure involving the dorsal talar head/neck junction best seen on sagittal image 10.  Also with small cystic lesion involving the medial margin of the distal fibular tip best seen on the coronal view -- this is likely related to previous lateral ligament repair performed in 2015..  Some mild-to-moderate thickening of the peroneus longus tendon best seen on the axial view consistent with tendinosis.  No abdirahman tear noted.        Assessment:  Caroline Hartman is a 44 y.o. female with Subtle cavus; Right ankle ATFL sprain; peroneal tendon strain.     Plan:   Clinical and radiographic findings were discussed.    No evidence of acute lateral ligament or peroneal tendon injury.    Patient okay to transition out of the boot into a lace-up ankle brace.  Patient reports she already obtain this on her own.    Initiate physical therapy per low ankle sprain and peroneal tendon strain protocol.    External referral provided at patient's request.    Okay to return to work in a limited capacity.  May return back to full work at full duty after 2-3 weeks of physical therapy.  Patient voiced understanding.  All questions were answered.  She will follow up on an as-needed basis.      This note was created using voice recognition software and may contain grammatical errors.

## 2024-10-18 ENCOUNTER — PATIENT MESSAGE (OUTPATIENT)
Dept: ORTHOPEDICS | Facility: CLINIC | Age: 44
End: 2024-10-18
Payer: COMMERCIAL

## 2024-11-08 ENCOUNTER — PATIENT MESSAGE (OUTPATIENT)
Dept: ORTHOPEDICS | Facility: CLINIC | Age: 44
End: 2024-11-08
Payer: COMMERCIAL

## 2024-11-15 ENCOUNTER — OFFICE VISIT (OUTPATIENT)
Dept: ORTHOPEDICS | Facility: CLINIC | Age: 44
End: 2024-11-15
Payer: COMMERCIAL

## 2024-11-15 DIAGNOSIS — S93.491A SPRAIN OF ANTERIOR TALOFIBULAR LIGAMENT OF RIGHT ANKLE, INITIAL ENCOUNTER: Primary | ICD-10-CM

## 2024-11-15 PROCEDURE — 99999 PR PBB SHADOW E&M-EST. PATIENT-LVL II: CPT | Mod: PBBFAC,,, | Performed by: ORTHOPAEDIC SURGERY

## 2024-11-15 NOTE — PROGRESS NOTES
Status/Diagnosis: Subtle cavus; Right ankle ATFL sprain; peroneal tendon strain.  Date of Surgery:   Right brostrom lateral ligament repair and 5th MT fracture ORIF by Dr. Holland (2015) with subsequent Right 5th MT HWR.  Date of Injury: none; DOO 09/27/2024  Return visit: PRN  X-rays on Return: pending patient complaint    Chief Complaint:   Chief Complaint   Patient presents with    Right Ankle - Pain     Present History:  Patient presents today via referral from No ref. provider found   Caroline Hartman is a 44 y.o. female who presents today for new patient evaluation.  Endorses a 5 day history of acute onset right lateral ankle/hindfoot pain.  No known history of injury or other inciting event.    Past surgical history is significant for previous right ankle lateral ligament repair and Murray fracture performed by Dr. Holland in 2015.  Subsequent right 5th metatarsal hardware removal shortly thereafter.    7/10 pain today.  No complaints of subjective ankle instability.  Patient was up on her feet constantly.  Works in the [employer] department.  Currently taking over-the-counter oral NSAIDs with mild relief.  No recent history of boot/brace immobilization, physical therapy, etc.   Denies any numbness or tingling.  Past medical history significant for atrial flutter and arrhythmia.  Denies tobacco use.  Remains active.    10/15/2024:  Patient returns today for repeat clinical evaluation and MRI results.  Overall with moderate clinical improvement since last being seen.  Tall cam boot has provided significant relief.  5/10 pain.  Weightbearing as tolerated.  Reports she already obtain a lace-up ankle brace.    11/15/2024:  Patient returns today for repeat clinical evaluation and possible released back to work at full duty without restriction.  She was currently full weight-bearing in normal shoe wear.  Still occasional lateral ankle/hindfoot pain.  Mostly related to increased activity.        Past  Medical History:   Diagnosis Date    Atrial flutter     Encounter for loop recorder at end of battery life     History of angina     PVC (premature ventricular contraction)     SVT (supraventricular tachycardia)        Past Surgical History:   Procedure Laterality Date     SECTION      COLONOSCOPY N/A 2023    Procedure: COLONOSCOPY;  Surgeon: Latonia Cruz MD;  Location: [facility] (4TH FLR);  Service: Endoscopy;  Laterality: N/A;  Ref By: Nancy  Prep Specifications: peg   Route instructions sent: via portal  loop recorder -medtronic  -precall complete-MS  -precall complete-MS    loop recorder      REMOVAL OF IMPLANTABLE LOOP RECORDER  2024    Procedure: Removal Loop recorder;  Surgeon: Magdiel Tate MD;  [facility];  Service: Cardiology;;    SHOULDER SURGERY         Current Outpatient Medications   Medication Sig    aspirin (ECOTRIN) 81 MG EC tablet Take 81 mg by mouth once daily.    metoprolol tartrate (LOPRESSOR) 25 MG tablet TAKE 1 TABLET(25 MG) BY MOUTH TWICE DAILY    naproxen (NAPROSYN) 500 MG tablet Take 1 tablet (500 mg total) by mouth every 12 (twelve) hours as needed.    ranolazine (RANEXA) 500 MG Tb12 Take 1 tablet (500 mg total) by mouth 2 (two) times daily.     No current facility-administered medications for this visit.       Review of patient's allergies indicates:   Allergen Reactions    Shrimp Itching    Sulfa (sulfonamide antibiotics)     Sulfa (sulfonamide antibiotics) Hives and Anxiety       No family history on file.    Social History     Socioeconomic History    Marital status:    Tobacco Use    Smoking status: Never    Smokeless tobacco: Never   Substance and Sexual Activity    Alcohol use: No    Drug use: Never   Social History Narrative    ** Merged History Encounter **          Social Drivers of Health     Financial Resource Strain: Low Risk  (2024)    Overall Financial Resource Strain (CARDIA)     Difficulty of Paying  Living Expenses: Not hard at all   Food Insecurity: No Food Insecurity (8/21/2024)    Hunger Vital Sign     Worried About Running Out of Food in the Last Year: Never true     Ran Out of Food in the Last Year: Never true   Physical Activity: Sufficiently Active (8/21/2024)    Exercise Vital Sign     Days of Exercise per Week: 4 days     Minutes of Exercise per Session: 60 min   Stress: No Stress Concern Present (8/21/2024)    [place] [employer] [place] [employer] - Occupational Stress Questionnaire     Feeling of Stress : Only a little   Housing Stability: Unknown (8/21/2024)    Housing Stability Vital Sign     Unable to Pay for Housing in the Last Year: No       Physical exam:  There were no vitals filed for this visit.  There is no height or weight on file to calculate BMI.  General: In no apparent distress; well developed and well nourished.  HEENT: normocephalic; atraumatic.  Cardiovascular: regular rate.  Respiratory: no increased work of breathing.  Musculoskeletal:   Gait:  Nonantalgic  Inspection:   Subtle cavus bilaterally.    Previous surgical scars are all well healed.    Mild persistent pain over the ATFL and along the course of the peroneals spanning from the groove to the tubercle.    No medial based ankle swelling or tenderness.  Equivocal laxity on anterior drawer and varus talar tilt testing.  Minimal pain with extremes of varus talar tilt testing, improved.  No pain referable to the foot.  Pain or laxity with Lisfranc stress.  Silfverskiold: Negative  Alignment:  Knee: neutral               Ankle: neutral              Hindfoot: subtle cavus              Forefoot: neutral   Strength:              Dorsiflexion 5/5  Plantar flexion 5/5  Inversion 5/5  Eversion 5/5  Sensation:              SILT distally  ROM:              Ankle: near full with pain at extremes              Subtalar: near full with pain at extremes  Pulses: Palpable pedal pulse                   Imaging Studies/Outside  documentation:  I have ordered/reviewed/interpreted the following images/outside documentation:  1. Weight-bearing 3-views of Right foot and ankle:   On my independent review, no acute bony abnormality noted.  Evidence of previous 5th metatarsal base fracture, well healed with some heterotopic ossification of the peroneus brevis insertion.  Increased calcaneal pitch with talonavicular over coverage.  Bipartite tibial sesamoid.  Ankle mortise remains congruent.  Hindfoot varus best seen on AP and mortise ankle views.    2. MRI Right ankle w/o contrast on 10/11/2024:  On my independent review, no osteochondral lesion noted.  Difficult visualization of the ATFL with washout appearance.  Confirmed 7 x 4 mm oval T2 hyperintense and T1 hypointense structure involving the dorsal talar head/neck junction best seen on sagittal image 10.  Also with small cystic lesion involving the medial margin of the distal fibular tip best seen on the coronal view -- this is likely related to previous lateral ligament repair performed in 2015..  Some mild-to-moderate thickening of the peroneus longus tendon best seen on the axial view consistent with tendinosis.  No abdirahman tear noted.        Assessment:  Caroline Hartman is a 44 y.o. female with Subtle cavus; Right ankle ATFL sprain; peroneal tendon strain.     Plan:   Moderate clinical improvement since last being seen.    Recommend continuation of physical therapy.  Patient okay to return back to work.  Recommend using her lace-up ankle brace full-time while working, at least for the first few weeks.    Patient voiced understanding.  All questions were answered.  She will follow up on an as-needed basis.      This note was created using voice recognition software and may contain grammatical errors.

## 2025-03-19 PROBLEM — E66.812 CLASS 2 SEVERE OBESITY DUE TO EXCESS CALORIES WITH SERIOUS COMORBIDITY AND BODY MASS INDEX (BMI) OF 37.0 TO 37.9 IN ADULT: Status: ACTIVE | Noted: 2025-03-19

## 2025-03-19 PROBLEM — E66.01 CLASS 2 SEVERE OBESITY DUE TO EXCESS CALORIES WITH SERIOUS COMORBIDITY AND BODY MASS INDEX (BMI) OF 37.0 TO 37.9 IN ADULT: Status: ACTIVE | Noted: 2025-03-19

## 2025-03-19 PROBLEM — I20.1 PRINZMETAL'S ANGINA: Status: ACTIVE | Noted: 2025-03-19

## 2025-04-01 PROBLEM — Z45.09 ENCOUNTER FOR LOOP RECORDER AT END OF BATTERY LIFE: Status: RESOLVED | Noted: 2024-09-13 | Resolved: 2025-04-01

## 2025-04-02 ENCOUNTER — OFFICE VISIT (OUTPATIENT)
Dept: CARDIOLOGY | Facility: CLINIC | Age: 45
End: 2025-04-02
Payer: COMMERCIAL

## 2025-04-02 VITALS
BODY MASS INDEX: 37.55 KG/M2 | WEIGHT: 253.5 LBS | HEIGHT: 69 IN | DIASTOLIC BLOOD PRESSURE: 71 MMHG | SYSTOLIC BLOOD PRESSURE: 108 MMHG | HEART RATE: 55 BPM

## 2025-04-02 DIAGNOSIS — E66.812 CLASS 2 SEVERE OBESITY DUE TO EXCESS CALORIES WITH SERIOUS COMORBIDITY AND BODY MASS INDEX (BMI) OF 37.0 TO 37.9 IN ADULT: ICD-10-CM

## 2025-04-02 DIAGNOSIS — E66.01 CLASS 2 SEVERE OBESITY DUE TO EXCESS CALORIES WITH SERIOUS COMORBIDITY AND BODY MASS INDEX (BMI) OF 37.0 TO 37.9 IN ADULT: ICD-10-CM

## 2025-04-02 DIAGNOSIS — I20.1 PRINZMETAL'S ANGINA: ICD-10-CM

## 2025-04-02 DIAGNOSIS — Z98.890 H/O CARDIAC RADIOFREQUENCY ABLATION: ICD-10-CM

## 2025-04-02 DIAGNOSIS — Z98.890 S/P ABLATION OF ATRIAL FLUTTER: ICD-10-CM

## 2025-04-02 DIAGNOSIS — Z86.79 S/P ABLATION OF ATRIAL FLUTTER: ICD-10-CM

## 2025-04-02 DIAGNOSIS — R07.9 CHEST PAIN, UNSPECIFIED TYPE: Primary | ICD-10-CM

## 2025-04-02 PROCEDURE — 3074F SYST BP LT 130 MM HG: CPT | Mod: CPTII,S$GLB,,

## 2025-04-02 PROCEDURE — 99214 OFFICE O/P EST MOD 30 MIN: CPT | Mod: S$GLB,,,

## 2025-04-02 PROCEDURE — 3008F BODY MASS INDEX DOCD: CPT | Mod: CPTII,S$GLB,,

## 2025-04-02 PROCEDURE — 3078F DIAST BP <80 MM HG: CPT | Mod: CPTII,S$GLB,,

## 2025-04-02 PROCEDURE — 99999 PR PBB SHADOW E&M-EST. PATIENT-LVL III: CPT | Mod: PBBFAC,,,

## 2025-04-02 PROCEDURE — 1159F MED LIST DOCD IN RCRD: CPT | Mod: CPTII,S$GLB,,

## 2025-04-02 NOTE — PROGRESS NOTES
Subjective:    Patient ID:  Caroline Hartman is a 44 y.o. female patient here for evaluation No chief complaint on file.    History of Present Illness:     Caroline Hartman is a 44 y.o. female who follows with Dr. Goode here today for follow up. Last seen in clinic 8/2024. She denies CP, SOB/CERVANTES, palpitations, dizziness, fatigue, activity intolerance. Significant improvement with Ranexa.     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   TTE 9/2024: Unremarkable  Atrial flutter s/p ablation 2013  S/p ILR 9/2020  No arrhythmias since implantation  Removed   SVT s/p ablation in 2014      Most Recent Echocardiogram Results  Results for orders placed during the hospital encounter of 09/12/24    Echo    Interpretation Summary    Left Ventricle: The left ventricle is normal in size. Normal wall thickness. There is normal systolic function. Ejection fraction by visual approximation is 55-60%. There is normal diastolic function.    Right Ventricle: Normal right ventricular cavity size. Wall thickness is normal. Systolic function is normal.    Aortic Valve: The aortic valve is a trileaflet valve.    Pulmonary Artery: The estimated pulmonary artery systolic pressure is 20 mmHg.    IVC/SVC: Normal venous pressure at 3 mmHg.      Most Recent Nuclear Stress Test Results  No results found for this or any previous visit.      Most Recent Cardiac PET Stress Test Results  Results for orders placed during the hospital encounter of 02/08/23    Cardiac PET Scan Stress    Interpretation Summary    There are two significant perfusion abnormalities.    Perfusion Abnormality #1 - First, there is a large sized, mild to moderate intensity mid to apical anterior, anteroseptal, inferoapical and lateral apical resting perfusion abnormality involving 25% of LV myocardium in the  typical distribution of the distal LAD territory. After pharmacologic stress, this defect improves, indicative of non-transmural scar.    Within the perfusion abnormality, absolute myocardial perfusion (cc/min/gm) averaged 0.38 cc/min/g at rest, 0.78 cc/min/g at stress and CFR was 2.04 , which equates to mildly reduced coronary flow capacity within the LAD territory.    Perfusion Abnormality #2 - Second, there is a very small (<5%) sized, mild intensity basal to mid anterior resting perfusion abnormality involving 3% of LV myocardium in the typical distribution of the ramus territory. After pharmacologic stress, this defect improves, indicative of non-transmural scar.    There are no other significant perfusion abnormalities.    The whole heart absolute myocardial perfusion values averaged 0.56 cc/min/g at rest, which is reduced; 1.18 cc/min/g at stress, which is mildly reduced; and CFR is 2.14 , which is mildly reduced.    CT attenuation images demonstrate no coronary calcifications and no aortic calcifications.    The gated perfusion images showed an ejection fraction of 67% at rest and 75% during stress. A normal ejection fraction is greater than 47%.    The wall motion is normal at rest and during stress.    The LV cavity size is normal at rest and stress.    The ECG portion of the study is negative for ischemia.    During stress, rare PVCs are noted.    The patient reported chest pain during the stress test.    There are no prior studies for comparison.      Most Recent Cardiovascular Angiogram results  No results found for this or any previous visit.      Other Most Recent Cardiology Results  Results for orders placed in visit on 01/14/24    Cardiac device check - Remote alert      REVIEW OF SYSTEMS: As noted in HPI   CARDIOVASCULAR: No recent chest pain, palpitations, arm/neck/jaw pain, or edema.  RESPIRATORY: No recent fever, cough, SOB.  : No blood in the urine  GI: No reflux, nausea, vomiting, or blood  in stool.   MUSCULOSKELETAL: No falls.   NEURO: No headaches, syncope, or dizziness.  EYES: No sudden changes in vision.     Past Medical History:   Diagnosis Date    Atrial flutter     Atrial flutter     Encounter for loop recorder at end of battery life     History of angina     PVC (premature ventricular contraction)     SVT (supraventricular tachycardia)      Past Surgical History:   Procedure Laterality Date     SECTION      COLONOSCOPY N/A 2023    Procedure: COLONOSCOPY;  Surgeon: Latonia Cruz MD;  Location: [address]);  Service: Endoscopy;  Laterality: N/A;  Ref By: Nancy  Prep Specifications: peg   Route instructions sent: via portal  loop recorder -medtronic  -precall complete-MS  -precall complete-MS    FRACTURE SURGERY  2015    Murray fracture R foot    loop recorder      REMOVAL OF IMPLANTABLE LOOP RECORDER  2024    Procedure: Removal Loop recorder;  Surgeon: Magdiel Tate MD;  Location: [facility];  Service: Cardiology;;    SHOULDER SURGERY       Social History[1]      Objective    There were no vitals filed for this visit.    The 10-year ASCVD risk score (Maria Elena DK, et al., 2019) is: 0.2%    Values used to calculate the score:      Age: 44 years      Sex: Female      Is Non- : No      Diabetic: No      Tobacco smoker: No      Systolic Blood Pressure: 108 mmHg      Is BP treated: No      HDL Cholesterol: 69 mg/dL      Total Cholesterol: 151 mg/dL      LAST EKG  Results for orders placed or performed in visit on 24   IN OFFICE EKG 12-LEAD (to [place])    Collection Time: 24  9:50 AM   Result Value Ref Range    QRS Duration 86 ms    [facility] QTC Calculation 411 ms    Narrative    Test Reason : Z98.890,Z98.890,Z86.79,    Vent. Rate : 063 BPM     Atrial Rate : 063 BPM     P-R Int : 172 ms          QRS Dur : 086 ms      QT Int : 402 ms       P-R-T Axes : 033 048 023 degrees     QTc Int : 411 ms    Normal sinus rhythm  Normal  "ECG  When compared with ECG of 29-MAR-2023 09:33,  No significant change was found  Confirmed by Jacky Myers MD ([id number]) on 8/21/2024 11:04:52 AM    Referred By: MAXIMILIANO   SELF           Confirmed By:Jacky Myers MD     LIPIDS - LAST 2   Lab Results   Component Value Date    CHOL 151 08/23/2024    HDL 69 08/23/2024    LDLCALC 60.2 (L) 08/23/2024    TRIG 109 08/23/2024    CHOLHDL 45.7 08/23/2024     CARDIAC PROFILE - LAST 2  No results found for: "BNP", "CPK", "CPKMB", "LDH", "TROPONINI"   CBC - LAST 2  No results found for: "WBC", "HGB", "HCT", "PLT"  No results found for: "LABPT", "INR", "APTT"  CHEMISTRY - LAST 2  Lab Results   Component Value Date    CREATININE 0.85 09/19/2024      ENDOCRINE - LAST 2  Lab Results   Component Value Date    HGBA1C 5.3 08/23/2024        PHYSICAL EXAM  CONSTITUTIONAL: Well built, well nourished in no apparent distress  NECK: no carotid bruit, no JVD  LUNGS: CTA  CHEST WALL: no tenderness  HEART: regular rate and rhythm, S1, S2 normal, no murmur, click, rub or gallop   ABDOMEN: soft, non-tender; bowel sounds normal; no masses,  no organomegaly  EXTREMITIES: Extremities normal, no edema, no calf tenderness noted  NEURO: AAO X 3    I HAVE REVIEWED :    The vital signs, most recent cardiac testing, and most recent pertinent non-cardiology provider notes.    Current Outpatient Medications   Medication Instructions    aspirin (ECOTRIN) 81 mg, Daily    metoprolol tartrate (LOPRESSOR) 25 mg, Oral, 2 times daily    naproxen (NAPROSYN) 500 mg, Oral, Every 12 hours PRN    ranolazine (RANEXA) 500 mg, Oral, 2 times daily        Assessment & Plan   Prinzmetal's angina   Cardiac Syndrome X  No anginal equivalents  Significant improvement with ranolazine  Continue ranolazine 500 mg BID    Atrial flutter s/p ablation    SVT s/p ablation  Stable     Obesity            I emphasized the importance of modifying lifestyle related risk factors including tobacco avoidance, limiting alcohol intake, aerobic " exercise, weight management and Mediterranean diet.      Follow up in about 1 year (around 4/2/2026).      Mj Freedman NP  [facility]   Office: [phone]       [1]   Social History  Tobacco Use    Smoking status: Never    Smokeless tobacco: Never   Substance Use Topics    Alcohol use: Yes     Comment: Once a mo    Drug use: Never

## 2025-04-28 ENCOUNTER — PATIENT MESSAGE (OUTPATIENT)
Dept: CARDIOLOGY | Facility: CLINIC | Age: 45
End: 2025-04-28
Payer: COMMERCIAL

## 2025-04-29 DIAGNOSIS — Z86.79 S/P ABLATION OF ATRIAL FLUTTER: ICD-10-CM

## 2025-04-29 DIAGNOSIS — Z98.890 H/O CARDIAC RADIOFREQUENCY ABLATION: ICD-10-CM

## 2025-04-29 DIAGNOSIS — Z00.00 ANNUAL PHYSICAL EXAM: ICD-10-CM

## 2025-04-29 DIAGNOSIS — Z98.890 S/P ABLATION OF ATRIAL FLUTTER: ICD-10-CM

## 2025-04-29 RX ORDER — METOPROLOL TARTRATE 25 MG/1
25 TABLET, FILM COATED ORAL 2 TIMES DAILY
Qty: 180 TABLET | Refills: 3 | Status: SHIPPED | OUTPATIENT
Start: 2025-04-29

## 2025-05-22 ENCOUNTER — PATIENT MESSAGE (OUTPATIENT)
Dept: OBSTETRICS AND GYNECOLOGY | Facility: CLINIC | Age: 45
End: 2025-05-22
Payer: COMMERCIAL

## 2025-08-25 ENCOUNTER — PATIENT MESSAGE (OUTPATIENT)
Dept: CARDIOLOGY | Facility: CLINIC | Age: 45
End: 2025-08-25
Payer: COMMERCIAL

## 2025-08-25 DIAGNOSIS — R07.9 CHEST PAIN, UNSPECIFIED TYPE: Primary | ICD-10-CM

## 2025-08-25 RX ORDER — RANOLAZINE 500 MG/1
500 TABLET, EXTENDED RELEASE ORAL 2 TIMES DAILY
Qty: 60 TABLET | Refills: 11 | OUTPATIENT
Start: 2025-08-25 | End: 2026-08-25

## 2025-08-25 RX ORDER — RANOLAZINE 500 MG/1
500 TABLET, EXTENDED RELEASE ORAL 2 TIMES DAILY
Qty: 60 TABLET | Refills: 11 | Status: SHIPPED | OUTPATIENT
Start: 2025-08-25 | End: 2026-08-25